# Patient Record
Sex: FEMALE | Race: WHITE | NOT HISPANIC OR LATINO | URBAN - METROPOLITAN AREA
[De-identification: names, ages, dates, MRNs, and addresses within clinical notes are randomized per-mention and may not be internally consistent; named-entity substitution may affect disease eponyms.]

---

## 2020-06-15 ENCOUNTER — TELEPHONE ENCOUNTER (OUTPATIENT)
Dept: URBAN - METROPOLITAN AREA CLINIC 80 | Facility: CLINIC | Age: 60
End: 2020-06-15

## 2020-06-15 RX ORDER — HYDROCORTISONE 100 MG/60ML
INSERT 60 MILLILITERS (100 MG) BY RECTAL ROUTE ONCE DAILY FOR 3 WEEKS ENEMA RECTAL 1
Qty: 1
Start: 2020-05-23

## 2020-07-24 ENCOUNTER — TELEPHONE ENCOUNTER (OUTPATIENT)
Dept: URBAN - METROPOLITAN AREA CLINIC 92 | Facility: CLINIC | Age: 60
End: 2020-07-24

## 2020-07-24 RX ORDER — MESALAMINE 1.2 G/1
TAKE 4 TABLETS BY MOUTH ONCE DAILY TABLET, DELAYED RELEASE ORAL ONCE A DAY
Qty: 360 | Refills: 3
Start: 2019-03-22 | End: 2020-03-16

## 2020-11-02 ENCOUNTER — WEB ENCOUNTER (OUTPATIENT)
Dept: URBAN - METROPOLITAN AREA CLINIC 80 | Facility: CLINIC | Age: 60
End: 2020-11-02

## 2020-11-02 ENCOUNTER — OFFICE VISIT (OUTPATIENT)
Dept: URBAN - METROPOLITAN AREA CLINIC 80 | Facility: CLINIC | Age: 60
End: 2020-11-02
Payer: COMMERCIAL

## 2020-11-02 DIAGNOSIS — K21.9 GERD: ICD-10-CM

## 2020-11-02 DIAGNOSIS — K51.30 ULCERATIVE PROCTOSIGMOIDITIS: ICD-10-CM

## 2020-11-02 DIAGNOSIS — R19.7 DIARRHEA: ICD-10-CM

## 2020-11-02 PROCEDURE — 3017F COLORECTAL CA SCREEN DOC REV: CPT | Performed by: INTERNAL MEDICINE

## 2020-11-02 PROCEDURE — G8482 FLU IMMUNIZE ORDER/ADMIN: HCPCS | Performed by: INTERNAL MEDICINE

## 2020-11-02 PROCEDURE — G8427 DOCREV CUR MEDS BY ELIG CLIN: HCPCS | Performed by: INTERNAL MEDICINE

## 2020-11-02 PROCEDURE — G8417 CALC BMI ABV UP PARAM F/U: HCPCS | Performed by: INTERNAL MEDICINE

## 2020-11-02 PROCEDURE — 99214 OFFICE O/P EST MOD 30 MIN: CPT | Performed by: INTERNAL MEDICINE

## 2020-11-02 PROCEDURE — G9903 PT SCRN TBCO ID AS NON USER: HCPCS | Performed by: INTERNAL MEDICINE

## 2020-11-02 RX ORDER — NORTRIPTYLINE HYDROCHLORIDE 10 MG/1
TAKE 2 CAPSULES (20 MG) BY ORAL ROUTE 4 TIMES PER DAY CAPSULE ORAL
Qty: 0 | Refills: 0 | Status: ACTIVE | COMMUNITY
Start: 1900-01-01

## 2020-11-02 RX ORDER — PROGESTERONE 100 MG/1
CAPSULE ORAL
Qty: 0 | Refills: 0 | Status: ACTIVE | COMMUNITY
Start: 1900-01-01

## 2020-11-02 RX ORDER — SODIUM, POTASSIUM,MAG SULFATES 17.5-3.13G
354ML SOLUTION, RECONSTITUTED, ORAL ORAL
Qty: 354 MILLILITER | Refills: 0 | OUTPATIENT
Start: 2020-11-02

## 2020-11-02 RX ORDER — SUCRALFATE 1 G/1
TAKE 1 TABLET BY ORAL ROUTE 4 TIMES A DAY AS NEEDED FOR 30 DAYS TABLET ORAL
Qty: 120 | Refills: 2 | Status: ACTIVE | COMMUNITY
Start: 2017-06-22

## 2020-11-02 RX ORDER — ATENOLOL 25 MG/1
TABLET ORAL
Qty: 0 | Refills: 0 | Status: ACTIVE | COMMUNITY
Start: 1900-01-01

## 2020-11-02 RX ORDER — HYDROCORTISONE 100 MG/60ML
INSERT 60 MILLILITERS (100 MG) BY RECTAL ROUTE ONCE DAILY FOR 3 WEEKS ENEMA RECTAL 1
Qty: 1 | Status: ACTIVE | COMMUNITY
Start: 2020-05-23

## 2020-11-02 RX ORDER — ONDANSETRON HYDROCHLORIDE 4 MG/1
TAKE 1 TABLET BY ORAL ROUTE 3 TIMES A DAY AS NEEDED FOR 30 DAYS TABLET, FILM COATED ORAL
Qty: 30 | Refills: 0 | Status: ACTIVE | COMMUNITY
Start: 2017-06-22

## 2020-11-02 RX ORDER — HYDROCORTISONE 100 MG/60ML
INSERT 60 MILLILITERS (100 MG) BY RECTAL ROUTE ONCE DAILY FOR 3 WEEKS ENEMA RECTAL 1
Qty: 1
Start: 2020-05-23

## 2020-11-02 RX ORDER — FAMOTIDINE 20 MG/1
TAKE 1 TABLET BY ORAL ROUTE 2 TIMES A DAY AS NEEDED FOR 90 DAYS TABLET ORAL 2
Qty: 180 | Refills: 3 | Status: ACTIVE | COMMUNITY
Start: 2020-01-24 | End: 2021-01-18

## 2020-11-02 RX ORDER — HYDROCORTISONE ACETATE 25 MG/1
UNWRAP AND _INSERT 1 SUPPOSITORY RECTALLY TWICE A DAY SUPPOSITORY RECTAL
Qty: 8 | Refills: 1 | Status: ACTIVE | COMMUNITY
Start: 2018-05-17

## 2020-11-02 NOTE — HPI-TODAY'S VISIT:
The patient is a 59 year old /White female, who presents on referral from AMBIKA Garza MD, for a gastroenterology evaluation for ulcerative colitis. last seen in 7/2019.  A copy of this document will be sent to the referring provider.    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  cortenemas help the most notes some nausea with this but no vomiting energy is mildly low some urgency stable weight normal appetite.  no other complaints.

## 2020-11-04 ENCOUNTER — TELEPHONE ENCOUNTER (OUTPATIENT)
Dept: URBAN - METROPOLITAN AREA CLINIC 92 | Facility: CLINIC | Age: 60
End: 2020-11-04

## 2020-11-04 RX ORDER — ONDANSETRON HYDROCHLORIDE 4 MG/1
TAKE 1 TABLET BY ORAL ROUTE 3 TIMES A DAY AS NEEDED FOR 30 DAYS TABLET, FILM COATED ORAL
Qty: 60 | Refills: 2

## 2020-12-11 LAB — C DIFFICILE TOXINS A+B, EIA: NEGATIVE

## 2020-12-16 ENCOUNTER — TELEPHONE ENCOUNTER (OUTPATIENT)
Dept: URBAN - METROPOLITAN AREA CLINIC 92 | Facility: CLINIC | Age: 60
End: 2020-12-16

## 2020-12-16 RX ORDER — HYDROCORTISONE 100 MG/60ML
INSERT 60 MILLILITERS (100 MG) BY RECTAL ROUTE ONCE DAILY FOR 2 WEEKS ENEMA RECTAL QHS
Qty: 60 | Refills: 1
Start: 2020-05-23 | End: 2021-12-15

## 2021-01-11 ENCOUNTER — WEB ENCOUNTER (OUTPATIENT)
Dept: URBAN - METROPOLITAN AREA CLINIC 80 | Facility: CLINIC | Age: 61
End: 2021-01-11

## 2021-01-14 ENCOUNTER — WEB ENCOUNTER (OUTPATIENT)
Dept: URBAN - METROPOLITAN AREA CLINIC 13 | Facility: CLINIC | Age: 61
End: 2021-01-14

## 2021-01-15 ENCOUNTER — OFFICE VISIT (OUTPATIENT)
Dept: URBAN - METROPOLITAN AREA SURGERY CENTER 31 | Facility: SURGERY CENTER | Age: 61
End: 2021-01-15
Payer: COMMERCIAL

## 2021-01-15 DIAGNOSIS — K51.30 CHRONIC ULCERATIVE PROCTOSIGMOIDITIS, WITHOUT COMPLICATIONS: ICD-10-CM

## 2021-01-15 DIAGNOSIS — K63.5 HYPERPLASTIC POLYP OF SIGMOID COLON: ICD-10-CM

## 2021-01-15 DIAGNOSIS — K51.50 LEFT SIDED COLITIS: ICD-10-CM

## 2021-01-15 PROCEDURE — G8907 PT DOC NO EVENTS ON DISCHARG: HCPCS | Performed by: INTERNAL MEDICINE

## 2021-01-15 PROCEDURE — 45380 COLONOSCOPY AND BIOPSY: CPT | Performed by: INTERNAL MEDICINE

## 2021-01-15 PROCEDURE — 45385 COLONOSCOPY W/LESION REMOVAL: CPT | Performed by: INTERNAL MEDICINE

## 2021-01-18 ENCOUNTER — WEB ENCOUNTER (OUTPATIENT)
Dept: URBAN - METROPOLITAN AREA CLINIC 80 | Facility: CLINIC | Age: 61
End: 2021-01-18

## 2021-01-18 RX ORDER — AZATHIOPRINE 50 MG/1
2 TABLETS TABLET ORAL
Qty: 180 | Refills: 3 | OUTPATIENT
Start: 2021-01-19 | End: 2022-01-14

## 2021-01-18 RX ORDER — MESALAMINE 1.2 G/1
TAKE 4 TABLETS BY MOUTH ONCE DAILY TABLET, DELAYED RELEASE ORAL ONCE A DAY
Qty: 360 | Refills: 3

## 2021-02-17 ENCOUNTER — TELEPHONE ENCOUNTER (OUTPATIENT)
Dept: URBAN - METROPOLITAN AREA CLINIC 96 | Facility: CLINIC | Age: 61
End: 2021-02-17

## 2021-02-18 ENCOUNTER — TELEPHONE ENCOUNTER (OUTPATIENT)
Dept: URBAN - METROPOLITAN AREA CLINIC 92 | Facility: CLINIC | Age: 61
End: 2021-02-18

## 2021-02-18 RX ORDER — PREDNISONE 20 MG/1
2 TABLETS TABLET ORAL ONCE A DAY
Qty: 28 | Refills: 1 | OUTPATIENT
Start: 2021-02-18 | End: 2021-03-18

## 2021-02-22 ENCOUNTER — ERX REFILL RESPONSE (OUTPATIENT)
Dept: URBAN - METROPOLITAN AREA CLINIC 80 | Facility: CLINIC | Age: 61
End: 2021-02-22

## 2021-02-22 RX ORDER — FAMOTIDINE 20 MG/1
TAKE 1 TABLET BY MOUTH TWICE DAILY TABLET, FILM COATED ORAL
Qty: 180 | Refills: 3

## 2021-03-01 ENCOUNTER — OFFICE VISIT (OUTPATIENT)
Dept: URBAN - METROPOLITAN AREA CLINIC 80 | Facility: CLINIC | Age: 61
End: 2021-03-01
Payer: COMMERCIAL

## 2021-03-01 DIAGNOSIS — R19.7 DIARRHEA: ICD-10-CM

## 2021-03-01 DIAGNOSIS — K21.9 GERD: ICD-10-CM

## 2021-03-01 DIAGNOSIS — K51.30 ULCERATIVE PROCTOSIGMOIDITIS: ICD-10-CM

## 2021-03-01 PROCEDURE — 99214 OFFICE O/P EST MOD 30 MIN: CPT | Performed by: INTERNAL MEDICINE

## 2021-03-01 RX ORDER — SUCRALFATE 1 G/1
TAKE 1 TABLET BY ORAL ROUTE 4 TIMES A DAY AS NEEDED FOR 30 DAYS TABLET ORAL
Qty: 120 | Refills: 2 | Status: ACTIVE | COMMUNITY
Start: 2017-06-22

## 2021-03-01 RX ORDER — FAMOTIDINE 20 MG/1
TAKE 1 TABLET BY MOUTH TWICE DAILY TABLET, FILM COATED ORAL
Qty: 180 | Refills: 3 | Status: ACTIVE | COMMUNITY

## 2021-03-01 RX ORDER — AZATHIOPRINE 50 MG/1
2 TABLETS TABLET ORAL
Qty: 180 | Refills: 3 | Status: ACTIVE | COMMUNITY
Start: 2021-01-19 | End: 2022-01-14

## 2021-03-01 RX ORDER — NORTRIPTYLINE HYDROCHLORIDE 10 MG/1
TAKE 2 CAPSULES (20 MG) BY ORAL ROUTE 4 TIMES PER DAY CAPSULE ORAL
Qty: 0 | Refills: 0 | Status: ACTIVE | COMMUNITY
Start: 1900-01-01

## 2021-03-01 RX ORDER — ONDANSETRON HYDROCHLORIDE 4 MG/1
TAKE 1 TABLET BY ORAL ROUTE 3 TIMES A DAY AS NEEDED FOR 30 DAYS TABLET, FILM COATED ORAL
Qty: 60 | Refills: 2 | Status: ACTIVE | COMMUNITY

## 2021-03-01 RX ORDER — HYDROCORTISONE 100 MG/60ML
_INSERT 60 MILLILITERS (100 MG) BY RECTAL ROUTE ONCE DAILY FOR 2 WEEKS ENEMA RECTAL QHS
Qty: 60 | Refills: 1 | Status: ACTIVE | COMMUNITY
Start: 2020-05-23 | End: 2021-12-15

## 2021-03-01 RX ORDER — SODIUM, POTASSIUM,MAG SULFATES 17.5-3.13G
354ML SOLUTION, RECONSTITUTED, ORAL ORAL
Qty: 354 MILLILITER | Refills: 0 | Status: ACTIVE | COMMUNITY
Start: 2020-11-02

## 2021-03-01 RX ORDER — MESALAMINE 1.2 G/1
TAKE 4 TABLETS BY MOUTH ONCE DAILY TABLET, DELAYED RELEASE ORAL ONCE A DAY
Qty: 360 | Refills: 3 | Status: ACTIVE | COMMUNITY

## 2021-03-01 RX ORDER — PREDNISONE 20 MG/1
2 TABLETS TABLET ORAL ONCE A DAY
Qty: 28 | Refills: 1 | Status: ACTIVE | COMMUNITY
Start: 2021-02-18 | End: 2021-03-18

## 2021-03-01 RX ORDER — HYDROCORTISONE ACETATE 25 MG/1
UNWRAP AND _INSERT 1 SUPPOSITORY RECTALLY TWICE A DAY SUPPOSITORY RECTAL
Qty: 8 | Refills: 1 | Status: ACTIVE | COMMUNITY
Start: 2018-05-17

## 2021-03-01 RX ORDER — PROGESTERONE 100 MG/1
CAPSULE ORAL
Qty: 0 | Refills: 0 | Status: ACTIVE | COMMUNITY
Start: 1900-01-01

## 2021-03-01 RX ORDER — ATENOLOL 25 MG/1
TABLET ORAL
Qty: 0 | Refills: 0 | Status: ACTIVE | COMMUNITY
Start: 1900-01-01

## 2021-03-01 NOTE — HPI-TODAY'S VISIT:
The patient is a 60 year old /White female, who presents on referral from AMBIKA Garza MD, for a gastroenterology evaluation for ulcerative colitis. last seen in 11/2020 .   she had active disease and had colon in 1/15/2021 with normal TI biospies. active colitis on colon biopsies.  here to discuss therapy.  A copy of this document will be sent to the referring provider.    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   here to discuss therapy i gave her imuran at the time of the colon due to noted colitis.  she did not start it ntoes improvement on prednisone now down to 20mg dosing notes 3-7 bm/day loose with no urgency no mucus or blood no abd pain energy is mostly normal discussed biologic agent but she wants to hold off and  try diet changes and avoiding carbonated water otherwise, stable weight normal appetite.  no other complaints.

## 2021-03-05 LAB
A/G RATIO: 2
ALBUMIN: 4.6
ALKALINE PHOSPHATASE: 81
ALT (SGPT): 25
AST (SGOT): 26
BASO (ABSOLUTE): 0
BASOS: 0
BILIRUBIN, TOTAL: 0.3
BUN/CREATININE RATIO: 18
BUN: 17
C-REACTIVE PROTEIN, QUANT: 1
CALCIUM: 9.7
CARBON DIOXIDE, TOTAL: 24
CHLORIDE: 102
CREATININE: 0.93
EGFR IF AFRICN AM: 78
EGFR IF NONAFRICN AM: 67
EOS (ABSOLUTE): 0
EOS: 0
GLOBULIN, TOTAL: 2.3
GLUCOSE: 107
HBSAG SCREEN: NEGATIVE
HEMATOCRIT: 40.1
HEMATOLOGY COMMENTS:: (no result)
HEMOGLOBIN: 13.3
HEP A AB, TOTAL: NEGATIVE
HEP B CORE AB, TOT: NEGATIVE
HEP B SURFACE AB, QUAL: NON REACTIVE
IMMATURE CELLS: (no result)
IMMATURE GRANS (ABS): 0.1
IMMATURE GRANULOCYTES: 1
LYMPHS (ABSOLUTE): 1.8
LYMPHS: 17
MCH: 30
MCHC: 33.2
MCV: 91
MONOCYTES(ABSOLUTE): 0.3
MONOCYTES: 3
NEUTROPHILS (ABSOLUTE): 8.2
NEUTROPHILS: 79
NRBC: (no result)
PLATELETS: 331
POTASSIUM: 5.2
PROTEIN, TOTAL: 6.9
QUANTIFERON CRITERIA: (no result)
QUANTIFERON INCUBATION: (no result)
QUANTIFERON MITOGEN VALUE: >10
QUANTIFERON NIL VALUE: 0.03
QUANTIFERON TB1 AG VALUE: 0.03
QUANTIFERON TB2 AG VALUE: 0.03
QUANTIFERON-TB GOLD PLUS: NEGATIVE
RBC: 4.43
RDW: 12
SODIUM: 139
WBC: 10.4

## 2021-03-08 ENCOUNTER — TELEPHONE ENCOUNTER (OUTPATIENT)
Dept: URBAN - METROPOLITAN AREA CLINIC 19 | Facility: CLINIC | Age: 61
End: 2021-03-08

## 2021-03-29 ENCOUNTER — OFFICE VISIT (OUTPATIENT)
Dept: URBAN - METROPOLITAN AREA CLINIC 80 | Facility: CLINIC | Age: 61
End: 2021-03-29

## 2021-03-30 ENCOUNTER — TELEPHONE ENCOUNTER (OUTPATIENT)
Dept: URBAN - METROPOLITAN AREA CLINIC 92 | Facility: CLINIC | Age: 61
End: 2021-03-30

## 2021-03-30 RX ORDER — CIMETIDINE 200 MG/1
1 TABLET AS NEEDED TABLET, FILM COATED ORAL TWICE A DAY
Qty: 180 TABLET | Refills: 3 | OUTPATIENT
Start: 2021-03-30

## 2021-03-30 RX ORDER — FAMOTIDINE 20 MG/1
TAKE 1 TABLET BY MOUTH TWICE DAILY TABLET, FILM COATED ORAL
OUTPATIENT

## 2021-04-01 ENCOUNTER — WEB ENCOUNTER (OUTPATIENT)
Dept: URBAN - METROPOLITAN AREA CLINIC 80 | Facility: CLINIC | Age: 61
End: 2021-04-01

## 2021-04-01 RX ORDER — ONDANSETRON HYDROCHLORIDE 4 MG/1
TAKE 1 TABLET BY ORAL ROUTE 3 TIMES A DAY AS NEEDED FOR 30 DAYS TABLET, FILM COATED ORAL
Qty: 60 | Refills: 2 | Status: ACTIVE | COMMUNITY

## 2021-04-01 RX ORDER — MESALAMINE 1.2 G/1
TAKE 4 TABLETS BY MOUTH ONCE DAILY TABLET, DELAYED RELEASE ORAL ONCE A DAY
Qty: 360 | Refills: 3 | Status: ACTIVE | COMMUNITY

## 2021-04-01 RX ORDER — PREDNISONE 20 MG/1
2 TABLETS TABLET ORAL ONCE A DAY
Qty: 28 | Refills: 1 | OUTPATIENT
Start: 2021-04-02 | End: 2021-04-30

## 2021-04-01 RX ORDER — HYDROCORTISONE ACETATE 25 MG/1
UNWRAP AND _INSERT 1 SUPPOSITORY RECTALLY TWICE A DAY SUPPOSITORY RECTAL
Qty: 8 | Refills: 1 | Status: ACTIVE | COMMUNITY
Start: 2018-05-17

## 2021-04-01 RX ORDER — HYDROCORTISONE 100 MG/60ML
_INSERT 60 MILLILITERS (100 MG) BY RECTAL ROUTE ONCE DAILY FOR 2 WEEKS ENEMA RECTAL QHS
Qty: 60 | Refills: 1 | Status: ACTIVE | COMMUNITY
Start: 2020-05-23 | End: 2021-12-15

## 2021-04-01 RX ORDER — PROGESTERONE 100 MG/1
CAPSULE ORAL
Qty: 0 | Refills: 0 | Status: ACTIVE | COMMUNITY
Start: 1900-01-01

## 2021-04-01 RX ORDER — SUCRALFATE 1 G/1
TAKE 1 TABLET BY ORAL ROUTE 4 TIMES A DAY AS NEEDED FOR 30 DAYS TABLET ORAL
Qty: 120 | Refills: 2 | Status: ACTIVE | COMMUNITY
Start: 2017-06-22

## 2021-04-01 RX ORDER — ATENOLOL 25 MG/1
TABLET ORAL
Qty: 0 | Refills: 0 | Status: ACTIVE | COMMUNITY
Start: 1900-01-01

## 2021-04-01 RX ORDER — CIMETIDINE 200 MG/1
1 TABLET AS NEEDED TABLET, FILM COATED ORAL TWICE A DAY
Qty: 180 TABLET | Refills: 3 | Status: ACTIVE | COMMUNITY
Start: 2021-03-30

## 2021-04-01 RX ORDER — AZATHIOPRINE 50 MG/1
2 TABLETS TABLET ORAL
Qty: 180 | Refills: 3 | Status: ACTIVE | COMMUNITY
Start: 2021-01-19 | End: 2022-01-14

## 2021-04-01 RX ORDER — NORTRIPTYLINE HYDROCHLORIDE 10 MG/1
TAKE 2 CAPSULES (20 MG) BY ORAL ROUTE 4 TIMES PER DAY CAPSULE ORAL
Qty: 0 | Refills: 0 | Status: ACTIVE | COMMUNITY
Start: 1900-01-01

## 2021-04-01 RX ORDER — SODIUM, POTASSIUM,MAG SULFATES 17.5-3.13G
354ML SOLUTION, RECONSTITUTED, ORAL ORAL
Qty: 354 MILLILITER | Refills: 0 | Status: ACTIVE | COMMUNITY
Start: 2020-11-02

## 2021-04-02 ENCOUNTER — WEB ENCOUNTER (OUTPATIENT)
Dept: URBAN - METROPOLITAN AREA CLINIC 80 | Facility: CLINIC | Age: 61
End: 2021-04-02

## 2021-05-03 ENCOUNTER — OFFICE VISIT (OUTPATIENT)
Dept: URBAN - METROPOLITAN AREA CLINIC 80 | Facility: CLINIC | Age: 61
End: 2021-05-03

## 2021-05-17 ENCOUNTER — OFFICE VISIT (OUTPATIENT)
Dept: URBAN - METROPOLITAN AREA CLINIC 80 | Facility: CLINIC | Age: 61
End: 2021-05-17
Payer: COMMERCIAL

## 2021-05-17 DIAGNOSIS — R19.7 DIARRHEA: ICD-10-CM

## 2021-05-17 DIAGNOSIS — K21.9 GERD: ICD-10-CM

## 2021-05-17 DIAGNOSIS — K51.30 ULCERATIVE PROCTOSIGMOIDITIS: ICD-10-CM

## 2021-05-17 PROCEDURE — 99214 OFFICE O/P EST MOD 30 MIN: CPT | Performed by: INTERNAL MEDICINE

## 2021-05-17 RX ORDER — PROGESTERONE 100 MG/1
CAPSULE ORAL
Qty: 0 | Refills: 0 | Status: ACTIVE | COMMUNITY
Start: 1900-01-01

## 2021-05-17 RX ORDER — SUCRALFATE 1 G/1
TAKE 1 TABLET BY ORAL ROUTE 4 TIMES A DAY AS NEEDED FOR 30 DAYS TABLET ORAL
Qty: 120 | Refills: 2 | Status: ACTIVE | COMMUNITY
Start: 2017-06-22

## 2021-05-17 RX ORDER — MESALAMINE 1.2 G/1
TAKE 4 TABLETS BY MOUTH ONCE DAILY TABLET, DELAYED RELEASE ORAL ONCE A DAY
Qty: 360 | Refills: 3 | Status: ACTIVE | COMMUNITY

## 2021-05-17 RX ORDER — HYDROCORTISONE ACETATE 25 MG/1
UNWRAP AND _INSERT 1 SUPPOSITORY RECTALLY TWICE A DAY SUPPOSITORY RECTAL
Qty: 8 | Refills: 1 | Status: ACTIVE | COMMUNITY
Start: 2018-05-17

## 2021-05-17 RX ORDER — AZATHIOPRINE 50 MG/1
2 TABLETS TABLET ORAL
OUTPATIENT
Start: 2021-01-19 | End: 2022-01-14

## 2021-05-17 RX ORDER — SODIUM, POTASSIUM,MAG SULFATES 17.5-3.13G
354ML SOLUTION, RECONSTITUTED, ORAL ORAL
Qty: 354 MILLILITER | Refills: 0 | Status: ON HOLD | COMMUNITY
Start: 2020-11-02

## 2021-05-17 RX ORDER — ONDANSETRON HYDROCHLORIDE 4 MG/1
TAKE 1 TABLET BY ORAL ROUTE 3 TIMES A DAY AS NEEDED FOR 30 DAYS TABLET, FILM COATED ORAL
Qty: 60 | Refills: 2 | Status: ACTIVE | COMMUNITY

## 2021-05-17 RX ORDER — NORTRIPTYLINE HYDROCHLORIDE 10 MG/1
TAKE 2 CAPSULES (20 MG) BY ORAL ROUTE 4 TIMES PER DAY CAPSULE ORAL
Qty: 0 | Refills: 0 | Status: ACTIVE | COMMUNITY
Start: 1900-01-01

## 2021-05-17 RX ORDER — PREDNISONE 20 MG/1
1 TABLETS TABLET ORAL ONCE A DAY
Qty: 30 | Refills: 1 | OUTPATIENT
Start: 2021-05-17 | End: 2021-07-16

## 2021-05-17 RX ORDER — ATENOLOL 25 MG/1
TABLET ORAL
Qty: 0 | Refills: 0 | Status: ACTIVE | COMMUNITY
Start: 1900-01-01

## 2021-05-17 RX ORDER — CIMETIDINE 200 MG/1
1 TABLET AS NEEDED TABLET, FILM COATED ORAL TWICE A DAY
Qty: 180 TABLET | Refills: 3 | Status: ACTIVE | COMMUNITY
Start: 2021-03-30

## 2021-05-17 RX ORDER — HYDROCORTISONE 100 MG/60ML
_INSERT 60 MILLILITERS (100 MG) BY RECTAL ROUTE ONCE DAILY FOR 2 WEEKS ENEMA RECTAL QHS
Qty: 60 | Refills: 1 | Status: ACTIVE | COMMUNITY
Start: 2020-05-23 | End: 2021-12-15

## 2021-05-17 RX ORDER — AZATHIOPRINE 75 1/1
2 PILLS DAILY TABLET ORAL DAILY
Qty: 180 | Refills: 3 | OUTPATIENT
Start: 2021-05-17 | End: 2022-05-12

## 2021-05-17 RX ORDER — AZATHIOPRINE 50 MG/1
2 TABLETS TABLET ORAL
Qty: 180 | Refills: 3 | Status: ACTIVE | COMMUNITY
Start: 2021-01-19 | End: 2022-01-14

## 2021-05-17 NOTE — HPI-TODAY'S VISIT:
The patient is a 60 year old /White female, who presents on referral from AMBIKA Garza MD, for a gastroenterology evaluation for ulcerative colitis. last seen in 3/1/2021.   she had active disease and had colon in 1/15/2021 with normal TI biospies. active colitis on colon biopsies.   here to discuss therapy given ongoing disease.   A copy of this document will be sent to the referring provider.   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   colon in 1/2021 with normal TI and signs of active colitis discussed needing to advance tx to biologic but she had reservations she feels taht there is increased stress at work that may improve soono improving on prednisone previously given imuran at the time of the colonoscopy but she did not start it improved on prednisone 20mg now off prednisone with recurrent symptoms notes 6-7 bm/day loose with no urgency no mucus or blood no abd pain energy is mostly normal discussed biologic agent but she wants to avoid.  discussed immune modulator and she is open to it.   otherwise, stable weight normal appetite.  no other complaints.

## 2021-05-18 ENCOUNTER — TELEPHONE ENCOUNTER (OUTPATIENT)
Dept: URBAN - METROPOLITAN AREA CLINIC 23 | Facility: CLINIC | Age: 61
End: 2021-05-18

## 2021-05-18 RX ORDER — AZATHIOPRINE 75 1/1
2 PILLS DAILY TABLET ORAL DAILY
OUTPATIENT
Start: 2021-05-17 | End: 2022-05-12

## 2021-05-18 RX ORDER — AZATHIOPRINE 50 MG/1
3 TABLETS DAILY TABLET ORAL DAILY
Qty: 270 TABLET | Refills: 3 | OUTPATIENT
Start: 2021-05-24 | End: 2022-05-19

## 2021-05-20 LAB
A/G RATIO: 1.9
ALBUMIN: 4.3
ALKALINE PHOSPHATASE: 89
ALT (SGPT): 18
AST (SGOT): 19
BASO (ABSOLUTE): 0
BASOS: 1
BILIRUBIN, TOTAL: 0.3
BUN/CREATININE RATIO: 23
BUN: 18
C-REACTIVE PROTEIN, QUANT: 12
CALCIUM: 9.1
CARBON DIOXIDE, TOTAL: 22
CHLORIDE: 105
CREATININE: 0.8
EGFR IF AFRICN AM: 93
EGFR IF NONAFRICN AM: 81
EOS (ABSOLUTE): 0.3
EOS: 4
GLOBULIN, TOTAL: 2.3
GLUCOSE: 84
HEMATOCRIT: 39.2
HEMATOLOGY COMMENTS:: (no result)
HEMOGLOBIN: 12.9
IMMATURE CELLS: (no result)
IMMATURE GRANS (ABS): 0
IMMATURE GRANULOCYTES: 0
INTERPRETATION:: (no result)
LYMPHS (ABSOLUTE): 2.4
LYMPHS: 32
Lab: (no result)
MCH: 30.1
MCHC: 32.9
MCV: 91
MONOCYTES(ABSOLUTE): 0.8
MONOCYTES: 11
NEUTROPHILS (ABSOLUTE): 3.9
NEUTROPHILS: 52
NRBC: (no result)
PLATELETS: 285
POTASSIUM: 4.3
PROTEIN, TOTAL: 6.6
RBC: 4.29
RDW: 13.1
SODIUM: 139
TPMT ACTIVITY: 11.6
WBC: 7.4

## 2021-05-24 ENCOUNTER — WEB ENCOUNTER (OUTPATIENT)
Dept: URBAN - METROPOLITAN AREA CLINIC 80 | Facility: CLINIC | Age: 61
End: 2021-05-24

## 2021-06-15 ENCOUNTER — WEB ENCOUNTER (OUTPATIENT)
Dept: URBAN - METROPOLITAN AREA CLINIC 80 | Facility: CLINIC | Age: 61
End: 2021-06-15

## 2021-06-16 ENCOUNTER — WEB ENCOUNTER (OUTPATIENT)
Dept: URBAN - METROPOLITAN AREA CLINIC 80 | Facility: CLINIC | Age: 61
End: 2021-06-16

## 2021-06-18 LAB
C-REACTIVE PROTEIN, QUANT: 4
HEMATOCRIT: 42.3
HEMOGLOBIN: 13
MCH: 28.4
MCHC: 30.7
MCV: 93
NRBC: (no result)
PLATELETS: 324
RBC: 4.57
RDW: 13
WBC: 8.7

## 2021-07-12 ENCOUNTER — WEB ENCOUNTER (OUTPATIENT)
Dept: URBAN - METROPOLITAN AREA CLINIC 80 | Facility: CLINIC | Age: 61
End: 2021-07-12

## 2021-07-14 ENCOUNTER — WEB ENCOUNTER (OUTPATIENT)
Dept: URBAN - METROPOLITAN AREA CLINIC 80 | Facility: CLINIC | Age: 61
End: 2021-07-14

## 2021-07-19 ENCOUNTER — OFFICE VISIT (OUTPATIENT)
Dept: URBAN - METROPOLITAN AREA CLINIC 80 | Facility: CLINIC | Age: 61
End: 2021-07-19

## 2021-07-26 ENCOUNTER — OFFICE VISIT (OUTPATIENT)
Dept: URBAN - METROPOLITAN AREA CLINIC 80 | Facility: CLINIC | Age: 61
End: 2021-07-26
Payer: COMMERCIAL

## 2021-07-26 VITALS
TEMPERATURE: 97.5 F | SYSTOLIC BLOOD PRESSURE: 128 MMHG | HEART RATE: 69 BPM | WEIGHT: 189 LBS | DIASTOLIC BLOOD PRESSURE: 76 MMHG | BODY MASS INDEX: 32.27 KG/M2 | HEIGHT: 64 IN

## 2021-07-26 DIAGNOSIS — K51.30 ULCERATIVE PROCTOSIGMOIDITIS: ICD-10-CM

## 2021-07-26 DIAGNOSIS — R19.7 DIARRHEA: ICD-10-CM

## 2021-07-26 DIAGNOSIS — K21.9 GERD: ICD-10-CM

## 2021-07-26 PROCEDURE — 99214 OFFICE O/P EST MOD 30 MIN: CPT | Performed by: INTERNAL MEDICINE

## 2021-07-26 RX ORDER — SODIUM, POTASSIUM,MAG SULFATES 17.5-3.13G
354ML SOLUTION, RECONSTITUTED, ORAL ORAL
Qty: 354 MILLILITER | Refills: 0 | Status: ON HOLD | COMMUNITY
Start: 2020-11-02

## 2021-07-26 RX ORDER — ONDANSETRON HYDROCHLORIDE 4 MG/1
TAKE 1 TABLET BY ORAL ROUTE 3 TIMES A DAY AS NEEDED FOR 30 DAYS TABLET, FILM COATED ORAL
Qty: 60 | Refills: 2 | Status: ACTIVE | COMMUNITY

## 2021-07-26 RX ORDER — AZATHIOPRINE 50 MG/1
3 TABLETS DAILY TABLET ORAL DAILY
Qty: 270 TABLET | Refills: 3 | Status: ACTIVE | COMMUNITY
Start: 2021-05-24 | End: 2022-05-19

## 2021-07-26 RX ORDER — CIMETIDINE 200 MG/1
1 TABLET AS NEEDED TABLET, FILM COATED ORAL TWICE A DAY
Qty: 180 TABLET | Refills: 3 | Status: ACTIVE | COMMUNITY
Start: 2021-03-30

## 2021-07-26 RX ORDER — MESALAMINE 1.2 G/1
TAKE 4 TABLETS BY MOUTH ONCE DAILY TABLET, DELAYED RELEASE ORAL ONCE A DAY
Qty: 360 | Refills: 3 | Status: ACTIVE | COMMUNITY

## 2021-07-26 RX ORDER — PROGESTERONE 100 MG/1
CAPSULE ORAL
Qty: 0 | Refills: 0 | Status: ACTIVE | COMMUNITY
Start: 1900-01-01

## 2021-07-26 RX ORDER — NORTRIPTYLINE HYDROCHLORIDE 10 MG/1
TAKE 2 CAPSULES (20 MG) BY ORAL ROUTE 4 TIMES PER DAY CAPSULE ORAL
Qty: 0 | Refills: 0 | Status: ACTIVE | COMMUNITY
Start: 1900-01-01

## 2021-07-26 RX ORDER — HYDROCORTISONE ACETATE 25 MG/1
UNWRAP AND _INSERT 1 SUPPOSITORY RECTALLY TWICE A DAY SUPPOSITORY RECTAL
Qty: 8 | Refills: 1 | Status: ACTIVE | COMMUNITY
Start: 2018-05-17

## 2021-07-26 RX ORDER — ATENOLOL 25 MG/1
TABLET ORAL
Qty: 0 | Refills: 0 | Status: ACTIVE | COMMUNITY
Start: 1900-01-01

## 2021-07-26 RX ORDER — HYDROCORTISONE 100 MG/60ML
_INSERT 60 MILLILITERS (100 MG) BY RECTAL ROUTE ONCE DAILY FOR 2 WEEKS ENEMA RECTAL QHS
Qty: 60 | Refills: 1 | Status: ACTIVE | COMMUNITY
Start: 2020-05-23 | End: 2021-12-15

## 2021-07-26 RX ORDER — SUCRALFATE 1 G/1
TAKE 1 TABLET BY ORAL ROUTE 4 TIMES A DAY AS NEEDED FOR 30 DAYS TABLET ORAL
Qty: 120 | Refills: 2 | Status: ACTIVE | COMMUNITY
Start: 2017-06-22

## 2021-07-26 RX ORDER — AZATHIOPRINE 50 MG/1
3 TABLETS DAILY TABLET ORAL DAILY
OUTPATIENT
Start: 2021-05-24 | End: 2022-05-19

## 2021-07-26 NOTE — HPI-TODAY'S VISIT:
The patient is a 60 year old /White female, who presents on referral from AMBIKA Garza MD, for a gastroenterology evaluation for ulcerative colitis.  previously stable on lialda has had active disease requiring prednisone Colon in 1/15/2021 with normal TI biospies. active colitis on colon biopsies.   started on imuran 150mg qday had side effects of nausea, arthritis, and fatigue here for follow up A copy of this document will be sent to the referring provider.   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   colon in 1/2021 with normal TI and signs of active colitis discussed needing to advance tx to biologic but she had reservations improving on prednisone but unable to wean off started imuran with improvement in her bm and was able to stop prednisone developed diffuse arthritis, fatigue and nausea  had pt stop the imuran here for discuss next step resolution of her nausea and significnat improvement in her arthritis now.   notes 2-3 bm/day mostly in the morning notes some urgency no mucus or blood no abd pain energy is mostly normal discussed next option for tx otherwise, stable weight normal appetite.  no other complaints.

## 2021-08-02 ENCOUNTER — TELEPHONE ENCOUNTER (OUTPATIENT)
Dept: URBAN - METROPOLITAN AREA CLINIC 80 | Facility: CLINIC | Age: 61
End: 2021-08-02

## 2021-08-02 RX ORDER — MESALAMINE 1.2 G/1
TAKE 4 TABLETS BY MOUTH ONCE DAILY TABLET, DELAYED RELEASE ORAL ONCE A DAY
Qty: 360 | Refills: 3
End: 2022-07-28

## 2021-08-03 LAB
A/G RATIO: 1.9
ALBUMIN: 4.6
ALKALINE PHOSPHATASE: 110
ALT (SGPT): 17
AST (SGOT): 20
BASO (ABSOLUTE): 0.1
BASOS: 1
BILIRUBIN, TOTAL: 0.4
BUN/CREATININE RATIO: 18
BUN: 14
C-REACTIVE PROTEIN, QUANT: 17
CALCIUM: 9.8
CARBON DIOXIDE, TOTAL: 21
CHLORIDE: 104
CREATININE: 0.78
EGFR IF AFRICN AM: 96
EGFR IF NONAFRICN AM: 83
EOS (ABSOLUTE): 0.5
EOS: 7
GLOBULIN, TOTAL: 2.4
GLUCOSE: 79
HEMATOCRIT: 38.9
HEMATOLOGY COMMENTS:: (no result)
HEMOGLOBIN: 12.7
HEP A AB, TOTAL: NEGATIVE
HEP B CORE AB, TOT: NEGATIVE
HEP B SURFACE AB, QUAL: NON REACTIVE
HEP C VIRUS AB: 0.1
HEPATITIS B SURF AB QUANT: <3.1
IMMATURE CELLS: (no result)
IMMATURE GRANS (ABS): 0
IMMATURE GRANULOCYTES: 0
LYMPHS (ABSOLUTE): 1.5
LYMPHS: 21
MCH: 31.5
MCHC: 32.6
MCV: 97
MONOCYTES(ABSOLUTE): 0.6
MONOCYTES: 9
NEUTROPHILS (ABSOLUTE): 4.2
NEUTROPHILS: 62
NRBC: (no result)
PLATELETS: 377
POTASSIUM: 4.4
PROTEIN, TOTAL: 7
QUANTIFERON CRITERIA: (no result)
QUANTIFERON INCUBATION: (no result)
QUANTIFERON MITOGEN VALUE: 0.53
QUANTIFERON NIL VALUE: 0.01
QUANTIFERON TB1 AG VALUE: 0.01
QUANTIFERON TB2 AG VALUE: 0.01
QUANTIFERON-TB GOLD PLUS: NEGATIVE
RBC: 4.03
RDW: 15.3
SODIUM: 140
WBC: 6.8

## 2021-08-11 ENCOUNTER — TELEPHONE ENCOUNTER (OUTPATIENT)
Dept: URBAN - METROPOLITAN AREA CLINIC 80 | Facility: CLINIC | Age: 61
End: 2021-08-11

## 2021-09-16 ENCOUNTER — TELEPHONE ENCOUNTER (OUTPATIENT)
Dept: URBAN - METROPOLITAN AREA CLINIC 80 | Facility: CLINIC | Age: 61
End: 2021-09-16

## 2021-09-24 ENCOUNTER — TELEPHONE ENCOUNTER (OUTPATIENT)
Dept: URBAN - METROPOLITAN AREA CLINIC 80 | Facility: CLINIC | Age: 61
End: 2021-09-24

## 2021-09-24 RX ORDER — HYDROCORTISONE 100 MG/60ML
_INSERT 60 MILLILITERS (100 MG) BY RECTAL ROUTE ONCE DAILY FOR 2 WEEKS ENEMA RECTAL QHS
Qty: 60 | Refills: 1 | Status: ACTIVE | COMMUNITY
Start: 2020-05-23 | End: 2021-12-15

## 2021-09-24 RX ORDER — ONDANSETRON HYDROCHLORIDE 4 MG/1
TAKE 1 TABLET BY ORAL ROUTE 3 TIMES A DAY AS NEEDED FOR 30 DAYS TABLET, FILM COATED ORAL
Qty: 60 | Refills: 2 | Status: ACTIVE | COMMUNITY

## 2021-09-24 RX ORDER — BUDESONIDE 9 MG/1
1 TABLET IN THE MORNING TABLET, EXTENDED RELEASE ORAL ONCE A DAY
Qty: 60 TABLET | Refills: 0 | OUTPATIENT
Start: 2021-09-24 | End: 2021-11-22

## 2021-09-24 RX ORDER — MESALAMINE 1.2 G/1
TAKE 4 TABLETS BY MOUTH ONCE DAILY TABLET, DELAYED RELEASE ORAL ONCE A DAY
Qty: 360 | Refills: 3 | Status: ACTIVE | COMMUNITY
End: 2022-07-28

## 2021-09-24 RX ORDER — HYDROCORTISONE ACETATE 25 MG/1
UNWRAP AND _INSERT 1 SUPPOSITORY RECTALLY TWICE A DAY SUPPOSITORY RECTAL
Qty: 8 | Refills: 1 | Status: ACTIVE | COMMUNITY
Start: 2018-05-17

## 2021-09-24 RX ORDER — PROGESTERONE 100 MG/1
CAPSULE ORAL
Qty: 0 | Refills: 0 | Status: ACTIVE | COMMUNITY
Start: 1900-01-01

## 2021-09-24 RX ORDER — CIMETIDINE 200 MG/1
1 TABLET AS NEEDED TABLET, FILM COATED ORAL TWICE A DAY
Qty: 180 TABLET | Refills: 3 | Status: ACTIVE | COMMUNITY
Start: 2021-03-30

## 2021-09-24 RX ORDER — NORTRIPTYLINE HYDROCHLORIDE 10 MG/1
TAKE 2 CAPSULES (20 MG) BY ORAL ROUTE 4 TIMES PER DAY CAPSULE ORAL
Qty: 0 | Refills: 0 | Status: ACTIVE | COMMUNITY
Start: 1900-01-01

## 2021-09-24 RX ORDER — SUCRALFATE 1 G/1
TAKE 1 TABLET BY ORAL ROUTE 4 TIMES A DAY AS NEEDED FOR 30 DAYS TABLET ORAL
Qty: 120 | Refills: 2 | Status: ACTIVE | COMMUNITY
Start: 2017-06-22

## 2021-09-24 RX ORDER — SODIUM, POTASSIUM,MAG SULFATES 17.5-3.13G
354ML SOLUTION, RECONSTITUTED, ORAL ORAL
Qty: 354 MILLILITER | Refills: 0 | Status: ON HOLD | COMMUNITY
Start: 2020-11-02

## 2021-09-24 RX ORDER — ATENOLOL 25 MG/1
TABLET ORAL
Qty: 0 | Refills: 0 | Status: ACTIVE | COMMUNITY
Start: 1900-01-01

## 2021-09-28 ENCOUNTER — OFFICE VISIT (OUTPATIENT)
Dept: URBAN - METROPOLITAN AREA CLINIC 80 | Facility: CLINIC | Age: 61
End: 2021-09-28
Payer: COMMERCIAL

## 2021-09-28 ENCOUNTER — WEB ENCOUNTER (OUTPATIENT)
Dept: URBAN - METROPOLITAN AREA CLINIC 80 | Facility: CLINIC | Age: 61
End: 2021-09-28

## 2021-09-28 VITALS
DIASTOLIC BLOOD PRESSURE: 77 MMHG | SYSTOLIC BLOOD PRESSURE: 131 MMHG | HEART RATE: 70 BPM | HEIGHT: 64 IN | TEMPERATURE: 97.2 F | BODY MASS INDEX: 32.61 KG/M2 | WEIGHT: 191 LBS

## 2021-09-28 DIAGNOSIS — K51.30 ULCERATIVE PROCTOSIGMOIDITIS: ICD-10-CM

## 2021-09-28 DIAGNOSIS — K21.9 GERD: ICD-10-CM

## 2021-09-28 DIAGNOSIS — R19.7 DIARRHEA: ICD-10-CM

## 2021-09-28 PROCEDURE — 99214 OFFICE O/P EST MOD 30 MIN: CPT | Performed by: INTERNAL MEDICINE

## 2021-09-28 RX ORDER — CIMETIDINE 200 MG/1
1 TABLET AS NEEDED TABLET, FILM COATED ORAL TWICE A DAY
Qty: 180 TABLET | Refills: 3 | Status: ACTIVE | COMMUNITY
Start: 2021-03-30

## 2021-09-28 RX ORDER — HYDROCORTISONE ACETATE 25 MG/1
UNWRAP AND _INSERT 1 SUPPOSITORY RECTALLY TWICE A DAY SUPPOSITORY RECTAL
Qty: 8 | Refills: 1 | Status: ACTIVE | COMMUNITY
Start: 2018-05-17

## 2021-09-28 RX ORDER — PROGESTERONE 100 MG/1
CAPSULE ORAL
Qty: 0 | Refills: 0 | Status: ACTIVE | COMMUNITY
Start: 1900-01-01

## 2021-09-28 RX ORDER — ONDANSETRON HYDROCHLORIDE 4 MG/1
TAKE 1 TABLET BY ORAL ROUTE 3 TIMES A DAY AS NEEDED FOR 30 DAYS TABLET, FILM COATED ORAL
Qty: 60 | Refills: 2 | Status: ACTIVE | COMMUNITY

## 2021-09-28 RX ORDER — HYDROCORTISONE 100 MG/60ML
_INSERT 60 MILLILITERS (100 MG) BY RECTAL ROUTE ONCE DAILY FOR 2 WEEKS ENEMA RECTAL QHS
Qty: 60 | Refills: 1 | Status: ACTIVE | COMMUNITY
Start: 2020-05-23 | End: 2021-12-15

## 2021-09-28 RX ORDER — NORTRIPTYLINE HYDROCHLORIDE 10 MG/1
TAKE 2 CAPSULES (20 MG) BY ORAL ROUTE 4 TIMES PER DAY CAPSULE ORAL
Qty: 0 | Refills: 0 | Status: ACTIVE | COMMUNITY
Start: 1900-01-01

## 2021-09-28 RX ORDER — SODIUM, POTASSIUM,MAG SULFATES 17.5-3.13G
354ML SOLUTION, RECONSTITUTED, ORAL ORAL
Qty: 354 MILLILITER | Refills: 0 | COMMUNITY
Start: 2020-11-02

## 2021-09-28 RX ORDER — SUCRALFATE 1 G/1
TAKE 1 TABLET BY ORAL ROUTE 4 TIMES A DAY AS NEEDED FOR 30 DAYS TABLET ORAL
Qty: 120 | Refills: 2 | Status: ACTIVE | COMMUNITY
Start: 2017-06-22

## 2021-09-28 RX ORDER — MESALAMINE 1.2 G/1
TAKE 4 TABLETS BY MOUTH ONCE DAILY TABLET, DELAYED RELEASE ORAL ONCE A DAY
Qty: 360 | Refills: 3 | Status: ACTIVE | COMMUNITY
End: 2022-07-28

## 2021-09-28 RX ORDER — ATENOLOL 25 MG/1
TABLET ORAL
Qty: 0 | Refills: 0 | Status: ACTIVE | COMMUNITY
Start: 1900-01-01

## 2021-09-28 NOTE — HPI-TODAY'S VISIT:
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   previously stable on lialda has had active disease requiring prednisone Colon in 1/15/2021 with normal TI biospies. active colitis on colon biopsies.   started on imuran 150mg qday had side effects of nausea, arthritis, and fatigue here for follow up recent flar this month and given budesonide A copy of this document will be sent to the referring provider.   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   colon in 1/2021 with normal TI and signs of active colitis discussed needing to advance tx to biologic but she had reservations improving on prednisone but unable to wean off started imuran with improvement in her bm and was able to stop prednisone developed diffuse arthritis, fatigue and nausea  had pt stop the imuran much better now after starting entocort notes 5-7 bm/day  no blood no abd pain no mucus energy improved and she believes it is back to normal stable weight normal appetite.  no other complaints.

## 2021-11-15 ENCOUNTER — OFFICE VISIT (OUTPATIENT)
Dept: URBAN - METROPOLITAN AREA CLINIC 80 | Facility: CLINIC | Age: 61
End: 2021-11-15

## 2021-11-15 RX ORDER — CIMETIDINE 200 MG/1
1 TABLET AS NEEDED TABLET, FILM COATED ORAL TWICE A DAY
Qty: 180 TABLET | Refills: 3 | Status: ACTIVE | COMMUNITY
Start: 2021-03-30

## 2021-11-15 RX ORDER — PROGESTERONE 100 MG/1
CAPSULE ORAL
Qty: 0 | Refills: 0 | Status: ACTIVE | COMMUNITY
Start: 1900-01-01

## 2021-11-15 RX ORDER — HYDROCORTISONE ACETATE 25 MG/1
UNWRAP AND _INSERT 1 SUPPOSITORY RECTALLY TWICE A DAY SUPPOSITORY RECTAL
Qty: 8 | Refills: 1 | Status: ACTIVE | COMMUNITY
Start: 2018-05-17

## 2021-11-15 RX ORDER — HYDROCORTISONE 100 MG/60ML
_INSERT 60 MILLILITERS (100 MG) BY RECTAL ROUTE ONCE DAILY FOR 2 WEEKS ENEMA RECTAL QHS
Qty: 60 | Refills: 1 | Status: ACTIVE | COMMUNITY
Start: 2020-05-23 | End: 2021-12-15

## 2021-11-15 RX ORDER — SUCRALFATE 1 G/1
TAKE 1 TABLET BY ORAL ROUTE 4 TIMES A DAY AS NEEDED FOR 30 DAYS TABLET ORAL
Qty: 120 | Refills: 2 | Status: ACTIVE | COMMUNITY
Start: 2017-06-22

## 2021-11-15 RX ORDER — NORTRIPTYLINE HYDROCHLORIDE 10 MG/1
TAKE 2 CAPSULES (20 MG) BY ORAL ROUTE 4 TIMES PER DAY CAPSULE ORAL
Qty: 0 | Refills: 0 | Status: ACTIVE | COMMUNITY
Start: 1900-01-01

## 2021-11-15 RX ORDER — SODIUM, POTASSIUM,MAG SULFATES 17.5-3.13G
354ML SOLUTION, RECONSTITUTED, ORAL ORAL
Qty: 354 MILLILITER | Refills: 0 | Status: DISCONTINUED | COMMUNITY
Start: 2020-11-02

## 2021-11-15 RX ORDER — ONDANSETRON HYDROCHLORIDE 4 MG/1
TAKE 1 TABLET BY ORAL ROUTE 3 TIMES A DAY AS NEEDED FOR 30 DAYS TABLET, FILM COATED ORAL
Qty: 60 | Refills: 2 | Status: ACTIVE | COMMUNITY

## 2021-11-15 RX ORDER — ATENOLOL 25 MG/1
TABLET ORAL
Qty: 0 | Refills: 0 | Status: ACTIVE | COMMUNITY
Start: 1900-01-01

## 2021-11-15 RX ORDER — MESALAMINE 1.2 G/1
TAKE 4 TABLETS BY MOUTH ONCE DAILY TABLET, DELAYED RELEASE ORAL ONCE A DAY
Qty: 360 | Refills: 3 | Status: ACTIVE | COMMUNITY
End: 2022-07-28

## 2021-11-18 ENCOUNTER — TELEPHONE ENCOUNTER (OUTPATIENT)
Dept: URBAN - METROPOLITAN AREA CLINIC 80 | Facility: CLINIC | Age: 61
End: 2021-11-18

## 2021-11-18 RX ORDER — ONDANSETRON HYDROCHLORIDE 4 MG/1
TAKE 1 TABLET BY ORAL ROUTE 3 TIMES A DAY AS NEEDED FOR NAUSEA TABLET, FILM COATED ORAL
Qty: 60 | Refills: 2

## 2021-11-30 ENCOUNTER — TELEPHONE ENCOUNTER (OUTPATIENT)
Dept: URBAN - METROPOLITAN AREA CLINIC 23 | Facility: CLINIC | Age: 61
End: 2021-11-30

## 2021-11-30 RX ORDER — BUDESONIDE 9 MG/1
1 TABLET IN THE MORNING TABLET, EXTENDED RELEASE ORAL ONCE A DAY
Qty: 60 TABLET | Refills: 0
Start: 2021-09-24 | End: 2022-01-29

## 2022-01-18 ENCOUNTER — OFFICE VISIT (OUTPATIENT)
Dept: URBAN - METROPOLITAN AREA CLINIC 80 | Facility: CLINIC | Age: 62
End: 2022-01-18

## 2022-01-18 RX ORDER — SUCRALFATE 1 G/1
TAKE 1 TABLET BY ORAL ROUTE 4 TIMES A DAY AS NEEDED FOR 30 DAYS TABLET ORAL
Qty: 120 | Refills: 2 | Status: ACTIVE | COMMUNITY
Start: 2017-06-22

## 2022-01-18 RX ORDER — ONDANSETRON HYDROCHLORIDE 4 MG/1
TAKE 1 TABLET BY ORAL ROUTE 3 TIMES A DAY AS NEEDED FOR NAUSEA TABLET, FILM COATED ORAL
Qty: 60 | Refills: 2 | Status: ACTIVE | COMMUNITY

## 2022-01-18 RX ORDER — HYDROCORTISONE ACETATE 25 MG/1
UNWRAP AND _INSERT 1 SUPPOSITORY RECTALLY TWICE A DAY SUPPOSITORY RECTAL
Qty: 8 | Refills: 1 | Status: ACTIVE | COMMUNITY
Start: 2018-05-17

## 2022-01-18 RX ORDER — ATENOLOL 25 MG/1
TABLET ORAL
Qty: 0 | Refills: 0 | Status: ACTIVE | COMMUNITY
Start: 1900-01-01

## 2022-01-18 RX ORDER — NORTRIPTYLINE HYDROCHLORIDE 10 MG/1
TAKE 2 CAPSULES (20 MG) BY ORAL ROUTE 4 TIMES PER DAY CAPSULE ORAL
Qty: 0 | Refills: 0 | Status: ACTIVE | COMMUNITY
Start: 1900-01-01

## 2022-01-18 RX ORDER — PROGESTERONE 100 MG/1
CAPSULE ORAL
Qty: 0 | Refills: 0 | Status: ACTIVE | COMMUNITY
Start: 1900-01-01

## 2022-01-18 RX ORDER — CIMETIDINE 200 MG/1
1 TABLET AS NEEDED TABLET, FILM COATED ORAL TWICE A DAY
Qty: 180 TABLET | Refills: 3 | Status: ACTIVE | COMMUNITY
Start: 2021-03-30

## 2022-01-18 RX ORDER — MESALAMINE 1.2 G/1
TAKE 4 TABLETS BY MOUTH ONCE DAILY TABLET, DELAYED RELEASE ORAL ONCE A DAY
Qty: 360 | Refills: 3 | Status: ACTIVE | COMMUNITY
End: 2022-07-28

## 2022-01-18 RX ORDER — BUDESONIDE 9 MG/1
1 TABLET IN THE MORNING TABLET, EXTENDED RELEASE ORAL ONCE A DAY
Qty: 60 TABLET | Refills: 0 | Status: ACTIVE | COMMUNITY
Start: 2021-09-24 | End: 2022-01-29

## 2022-02-17 ENCOUNTER — TELEPHONE ENCOUNTER (OUTPATIENT)
Dept: URBAN - METROPOLITAN AREA CLINIC 80 | Facility: CLINIC | Age: 62
End: 2022-02-17

## 2022-02-25 ENCOUNTER — ERX REFILL RESPONSE (OUTPATIENT)
Dept: URBAN - METROPOLITAN AREA CLINIC 80 | Facility: CLINIC | Age: 62
End: 2022-02-25

## 2022-02-25 ENCOUNTER — OFFICE VISIT (OUTPATIENT)
Dept: URBAN - METROPOLITAN AREA CLINIC 80 | Facility: CLINIC | Age: 62
End: 2022-02-25
Payer: COMMERCIAL

## 2022-02-25 VITALS
TEMPERATURE: 97.2 F | SYSTOLIC BLOOD PRESSURE: 137 MMHG | BODY MASS INDEX: 32.54 KG/M2 | WEIGHT: 190.6 LBS | HEIGHT: 64 IN | DIASTOLIC BLOOD PRESSURE: 72 MMHG

## 2022-02-25 DIAGNOSIS — R19.7 DIARRHEA: ICD-10-CM

## 2022-02-25 DIAGNOSIS — K21.9 GERD: ICD-10-CM

## 2022-02-25 DIAGNOSIS — K51.30 ULCERATIVE PROCTOSIGMOIDITIS: ICD-10-CM

## 2022-02-25 PROCEDURE — 99214 OFFICE O/P EST MOD 30 MIN: CPT | Performed by: INTERNAL MEDICINE

## 2022-02-25 RX ORDER — HYDROCORTISONE ACETATE 25 MG/1
UNWRAP AND _INSERT 1 SUPPOSITORY RECTALLY TWICE A DAY SUPPOSITORY RECTAL
Qty: 8 | Refills: 1 | Status: ACTIVE | COMMUNITY
Start: 2018-05-17

## 2022-02-25 RX ORDER — FAMOTIDINE 20 MG/1
TAKE 1 TABLET BY MOUTH TWICE DAILY TABLET, FILM COATED ORAL
Qty: 180 TABLET | Refills: 4 | OUTPATIENT

## 2022-02-25 RX ORDER — ONDANSETRON HYDROCHLORIDE 4 MG/1
TAKE 1 TABLET BY ORAL ROUTE 3 TIMES A DAY AS NEEDED FOR NAUSEA TABLET, FILM COATED ORAL
Qty: 60 | Refills: 2 | Status: ACTIVE | COMMUNITY

## 2022-02-25 RX ORDER — FAMOTIDINE 20 MG/1
TAKE 1 TABLET BY MOUTH TWICE DAILY TABLET, FILM COATED ORAL
Qty: 180 TABLET | Refills: 0 | OUTPATIENT

## 2022-02-25 RX ORDER — MESALAMINE 1.2 G/1
TAKE 4 TABLETS BY MOUTH ONCE DAILY TABLET, DELAYED RELEASE ORAL ONCE A DAY
Qty: 360 | Refills: 3 | Status: ACTIVE | COMMUNITY
End: 2022-07-28

## 2022-02-25 RX ORDER — SODIUM SULFATE, MAGNESIUM SULFATE, AND POTASSIUM CHLORIDE 17.75; 2.7; 2.25 G/1; G/1; G/1
12 TABLETS TABLET ORAL
Qty: 24 TABLETS | Refills: 0 | OUTPATIENT
Start: 2022-02-25 | End: 2022-02-26

## 2022-02-25 RX ORDER — HYDROCORTISONE 100 MG/60ML
60 ML IN THE EVENING SUSPENSION RECTAL
Qty: 900 | OUTPATIENT
Start: 2022-02-25 | End: 2022-03-27

## 2022-02-25 RX ORDER — NORTRIPTYLINE HYDROCHLORIDE 10 MG/1
TAKE 2 CAPSULES (20 MG) BY ORAL ROUTE 4 TIMES PER DAY CAPSULE ORAL
Qty: 0 | Refills: 0 | Status: ACTIVE | COMMUNITY
Start: 1900-01-01

## 2022-02-25 RX ORDER — ATENOLOL 25 MG/1
TABLET ORAL
Qty: 0 | Refills: 0 | Status: ACTIVE | COMMUNITY
Start: 1900-01-01

## 2022-02-25 RX ORDER — CIMETIDINE 200 MG/1
1 TABLET AS NEEDED TABLET, FILM COATED ORAL TWICE A DAY
Qty: 180 TABLET | Refills: 3 | Status: ACTIVE | COMMUNITY
Start: 2021-03-30

## 2022-02-25 RX ORDER — SUCRALFATE 1 G/1
TAKE 1 TABLET BY ORAL ROUTE 4 TIMES A DAY AS NEEDED FOR 30 DAYS TABLET ORAL
Qty: 120 | Refills: 2 | Status: ACTIVE | COMMUNITY
Start: 2017-06-22

## 2022-02-25 RX ORDER — PROGESTERONE 100 MG/1
CAPSULE ORAL
Qty: 0 | Refills: 0 | Status: ACTIVE | COMMUNITY
Start: 1900-01-01

## 2022-02-26 LAB
A/G RATIO: 2.2
ALBUMIN: 4.6
ALKALINE PHOSPHATASE: 77
ALT (SGPT): 19
AST (SGOT): 22
BASO (ABSOLUTE): 0
BASOS: 0
BILIRUBIN, TOTAL: 0.3
BUN/CREATININE RATIO: 16
BUN: 13
C-REACTIVE PROTEIN, QUANT: 3
CALCIUM: 10.1
CARBON DIOXIDE, TOTAL: 24
CHLORIDE: 103
CREATININE: 0.8
EGFR IF AFRICN AM: 93
EGFR IF NONAFRICN AM: 80
EOS (ABSOLUTE): 0.1
EOS: 1
GLOBULIN, TOTAL: 2.1
GLUCOSE: 74
HEMATOCRIT: 39.4
HEMATOLOGY COMMENTS:: (no result)
HEMOGLOBIN: 13
IMMATURE CELLS: (no result)
IMMATURE GRANS (ABS): 0
IMMATURE GRANULOCYTES: 1
LYMPHS (ABSOLUTE): 2.7
LYMPHS: 35
MCH: 30
MCHC: 33
MCV: 91
MONOCYTES(ABSOLUTE): 0.6
MONOCYTES: 8
NEUTROPHILS (ABSOLUTE): 4.2
NEUTROPHILS: 55
NRBC: (no result)
PLATELETS: 307
POTASSIUM: 4.6
PROTEIN, TOTAL: 6.7
RBC: 4.34
RDW: 12.4
SODIUM: 141
WBC: 7.7

## 2022-03-04 ENCOUNTER — WEB ENCOUNTER (OUTPATIENT)
Dept: URBAN - METROPOLITAN AREA CLINIC 80 | Facility: CLINIC | Age: 62
End: 2022-03-04

## 2022-03-04 RX ORDER — VEDOLIZUMAB 300 MG/5ML
AS DIRECTED INJECTION, POWDER, LYOPHILIZED, FOR SOLUTION INTRAVENOUS
Qty: 300 MILLIGRAMS | Refills: 0 | OUTPATIENT
Start: 2022-03-16 | End: 2022-06-14

## 2022-03-08 ENCOUNTER — WEB ENCOUNTER (OUTPATIENT)
Dept: URBAN - METROPOLITAN AREA CLINIC 80 | Facility: CLINIC | Age: 62
End: 2022-03-08

## 2022-03-14 ENCOUNTER — TELEPHONE ENCOUNTER (OUTPATIENT)
Dept: URBAN - METROPOLITAN AREA CLINIC 80 | Facility: CLINIC | Age: 62
End: 2022-03-14

## 2022-04-18 ENCOUNTER — TELEPHONE ENCOUNTER (OUTPATIENT)
Dept: URBAN - METROPOLITAN AREA CLINIC 80 | Facility: CLINIC | Age: 62
End: 2022-04-18

## 2022-04-18 ENCOUNTER — WEB ENCOUNTER (OUTPATIENT)
Dept: URBAN - METROPOLITAN AREA CLINIC 80 | Facility: CLINIC | Age: 62
End: 2022-04-18

## 2022-04-19 ENCOUNTER — ERX REFILL RESPONSE (OUTPATIENT)
Dept: URBAN - METROPOLITAN AREA CLINIC 80 | Facility: CLINIC | Age: 62
End: 2022-04-19

## 2022-04-19 RX ORDER — HYDROCORTISONE 100 MG/60ML
INSTILL 60 ML RECTALLY IN THE EVENING AS DIRECTED ENEMA RECTAL
Qty: 1260 MILLILITER | Refills: 0 | OUTPATIENT

## 2022-04-19 RX ORDER — HYDROCORTISONE 100 MG/60ML
INSTILL 60 ML RECTALLY IN THE EVENING AS DIRECTED ENEMA RECTAL
Qty: 1260 MILLILITER | Refills: 1 | OUTPATIENT

## 2022-05-13 ENCOUNTER — OFFICE VISIT (OUTPATIENT)
Dept: URBAN - METROPOLITAN AREA CLINIC 80 | Facility: CLINIC | Age: 62
End: 2022-05-13

## 2022-05-13 RX ORDER — ATENOLOL 25 MG/1
TABLET ORAL
Qty: 0 | Refills: 0 | Status: ACTIVE | COMMUNITY
Start: 1900-01-01

## 2022-05-13 RX ORDER — SUCRALFATE 1 G/1
TAKE 1 TABLET BY ORAL ROUTE 4 TIMES A DAY AS NEEDED FOR 30 DAYS TABLET ORAL
Qty: 120 | Refills: 2 | Status: ACTIVE | COMMUNITY
Start: 2017-06-22

## 2022-05-13 RX ORDER — PROGESTERONE 100 MG/1
CAPSULE ORAL
Qty: 0 | Refills: 0 | Status: ACTIVE | COMMUNITY
Start: 1900-01-01

## 2022-05-13 RX ORDER — MESALAMINE 1.2 G/1
TAKE 4 TABLETS BY MOUTH ONCE DAILY TABLET, DELAYED RELEASE ORAL ONCE A DAY
Qty: 360 | Refills: 3 | Status: ACTIVE | COMMUNITY
End: 2022-07-28

## 2022-05-13 RX ORDER — FAMOTIDINE 20 MG/1
TAKE 1 TABLET BY MOUTH TWICE DAILY TABLET, FILM COATED ORAL
Qty: 180 TABLET | Refills: 4 | Status: ACTIVE | COMMUNITY

## 2022-05-13 RX ORDER — NORTRIPTYLINE HYDROCHLORIDE 10 MG/1
TAKE 2 CAPSULES (20 MG) BY ORAL ROUTE 4 TIMES PER DAY CAPSULE ORAL
Qty: 0 | Refills: 0 | Status: ACTIVE | COMMUNITY
Start: 1900-01-01

## 2022-05-13 RX ORDER — HYDROCORTISONE ACETATE 25 MG/1
UNWRAP AND _INSERT 1 SUPPOSITORY RECTALLY TWICE A DAY SUPPOSITORY RECTAL
Qty: 8 | Refills: 1 | Status: ACTIVE | COMMUNITY
Start: 2018-05-17

## 2022-05-13 RX ORDER — ONDANSETRON HYDROCHLORIDE 4 MG/1
TAKE 1 TABLET BY ORAL ROUTE 3 TIMES A DAY AS NEEDED FOR NAUSEA TABLET, FILM COATED ORAL
Qty: 60 | Refills: 2 | Status: ACTIVE | COMMUNITY

## 2022-05-13 RX ORDER — CIMETIDINE 200 MG/1
1 TABLET AS NEEDED TABLET, FILM COATED ORAL TWICE A DAY
Qty: 180 TABLET | Refills: 3 | Status: ACTIVE | COMMUNITY
Start: 2021-03-30

## 2022-05-13 RX ORDER — VEDOLIZUMAB 300 MG/5ML
AS DIRECTED INJECTION, POWDER, LYOPHILIZED, FOR SOLUTION INTRAVENOUS
Qty: 300 MILLIGRAMS | Refills: 0 | Status: ACTIVE | COMMUNITY
Start: 2022-03-16 | End: 2022-06-14

## 2022-06-13 ENCOUNTER — WEB ENCOUNTER (OUTPATIENT)
Dept: URBAN - METROPOLITAN AREA CLINIC 80 | Facility: CLINIC | Age: 62
End: 2022-06-13

## 2022-06-14 ENCOUNTER — WEB ENCOUNTER (OUTPATIENT)
Dept: URBAN - METROPOLITAN AREA CLINIC 80 | Facility: CLINIC | Age: 62
End: 2022-06-14

## 2022-06-20 NOTE — HPI-TODAY'S VISIT:
Pt scheduled, Son Nicolas Miller Notified. 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   last seen in 9/2021 previously stable on lialda has had active disease requiring prednisone Colon in 1/15/2021 with normal TI biospies. active colitis on colon biopsies.   started on imuran 150mg qday had side effects of nausea, arthritis, and fatigue discussed biologic and had labs in 8/2021 with normal TB and viral hep b/c labs she wanted to hold off biologic trial and has been taking steroid enemas here for follow up A copy of this document will be sent to the referring provider.   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   colon in 1/2021 with normal TI and signs of active colitis discussed needing to advance tx to biologic but she had reservations improving on prednisone but unable to wean off started imuran with improvement in her bm and was able to stop prednisone developed diffuse arthritis, fatigue and nausea  had pt stop the imuran much better  on entocort but with ongoing symptoms she declined sgarting biologic in 9/2021 she comes in with improved but ongoing symptoms notes 3-5 bm/day  mostly with meals she is on hydrocortisone enemas daily no blood no abd pain no mucus energy improved stable weight normal appetite.  no other complaints.

## 2022-06-22 ENCOUNTER — TELEPHONE ENCOUNTER (OUTPATIENT)
Dept: URBAN - METROPOLITAN AREA CLINIC 80 | Facility: CLINIC | Age: 62
End: 2022-06-22

## 2022-06-23 ENCOUNTER — WEB ENCOUNTER (OUTPATIENT)
Dept: URBAN - METROPOLITAN AREA CLINIC 80 | Facility: CLINIC | Age: 62
End: 2022-06-23

## 2022-06-23 RX ORDER — BUDESONIDE 9 MG/1
1 TABLET IN THE MORNING TABLET, EXTENDED RELEASE ORAL ONCE A DAY
Qty: 60 TABLET | Refills: 0 | OUTPATIENT
Start: 2022-06-23 | End: 2022-08-22

## 2022-06-30 ENCOUNTER — WEB ENCOUNTER (OUTPATIENT)
Dept: URBAN - METROPOLITAN AREA CLINIC 80 | Facility: CLINIC | Age: 62
End: 2022-06-30

## 2022-07-05 ENCOUNTER — WEB ENCOUNTER (OUTPATIENT)
Dept: URBAN - METROPOLITAN AREA CLINIC 80 | Facility: CLINIC | Age: 62
End: 2022-07-05

## 2022-07-14 ENCOUNTER — WEB ENCOUNTER (OUTPATIENT)
Dept: URBAN - METROPOLITAN AREA CLINIC 128 | Facility: CLINIC | Age: 62
End: 2022-07-14

## 2022-07-20 ENCOUNTER — OFFICE VISIT (OUTPATIENT)
Dept: URBAN - METROPOLITAN AREA CLINIC 128 | Facility: CLINIC | Age: 62
End: 2022-07-20
Payer: COMMERCIAL

## 2022-07-20 VITALS
SYSTOLIC BLOOD PRESSURE: 146 MMHG | HEIGHT: 63 IN | DIASTOLIC BLOOD PRESSURE: 88 MMHG | HEART RATE: 73 BPM | BODY MASS INDEX: 32.25 KG/M2 | TEMPERATURE: 97.8 F | WEIGHT: 182 LBS

## 2022-07-20 DIAGNOSIS — Z12.11 COLON CANCER SCREENING: ICD-10-CM

## 2022-07-20 DIAGNOSIS — K51.30 ULCERATIVE PROCTOSIGMOIDITIS: ICD-10-CM

## 2022-07-20 DIAGNOSIS — K21.9 GERD: ICD-10-CM

## 2022-07-20 DIAGNOSIS — R19.7 DIARRHEA: ICD-10-CM

## 2022-07-20 PROBLEM — 235595009 GASTROESOPHAGEAL REFLUX DISEASE: Status: ACTIVE | Noted: 2020-11-02

## 2022-07-20 PROCEDURE — 99214 OFFICE O/P EST MOD 30 MIN: CPT | Performed by: INTERNAL MEDICINE

## 2022-07-20 RX ORDER — BUDESONIDE 9 MG/1
1 TABLET IN THE MORNING TABLET, EXTENDED RELEASE ORAL ONCE A DAY
Qty: 60 TABLET | Refills: 0 | Status: ACTIVE | COMMUNITY
Start: 2022-06-23 | End: 2022-08-22

## 2022-07-20 RX ORDER — HYDROCORTISONE ACETATE 25 MG/1
UNWRAP AND _INSERT 1 SUPPOSITORY RECTALLY TWICE A DAY SUPPOSITORY RECTAL
Qty: 8 | Refills: 1 | Status: ACTIVE | COMMUNITY
Start: 2018-05-17

## 2022-07-20 RX ORDER — NORTRIPTYLINE HYDROCHLORIDE 10 MG/1
TAKE 2 CAPSULES (20 MG) BY ORAL ROUTE 4 TIMES PER DAY CAPSULE ORAL
Qty: 0 | Refills: 0 | Status: ACTIVE | COMMUNITY
Start: 1900-01-01

## 2022-07-20 RX ORDER — FAMOTIDINE 20 MG/1
TAKE 1 TABLET BY MOUTH TWICE DAILY TABLET, FILM COATED ORAL
Qty: 180 TABLET | Refills: 4 | Status: ACTIVE | COMMUNITY

## 2022-07-20 RX ORDER — MESALAMINE 1.2 G/1
TAKE 4 TABLETS BY MOUTH ONCE DAILY TABLET, DELAYED RELEASE ORAL ONCE A DAY
Qty: 360 | Refills: 3 | Status: ACTIVE | COMMUNITY
End: 2022-07-28

## 2022-07-20 RX ORDER — SUCRALFATE 1 G/1
TAKE 1 TABLET BY ORAL ROUTE 4 TIMES A DAY AS NEEDED FOR 30 DAYS TABLET ORAL
Qty: 120 | Refills: 2 | Status: ACTIVE | COMMUNITY
Start: 2017-06-22

## 2022-07-20 RX ORDER — ATENOLOL 25 MG/1
TABLET ORAL
Qty: 0 | Refills: 0 | Status: ACTIVE | COMMUNITY
Start: 1900-01-01

## 2022-07-20 RX ORDER — CIMETIDINE 200 MG/1
1 TABLET AS NEEDED TABLET, FILM COATED ORAL TWICE A DAY
Qty: 180 TABLET | Refills: 3 | Status: ACTIVE | COMMUNITY
Start: 2021-03-30

## 2022-07-20 RX ORDER — HYDROCORTISONE 100 MG/60ML
INSTILL 60 ML RECTALLY IN THE EVENING AS DIRECTED ENEMA RECTAL
Qty: 1260 MILLILITER | Refills: 1 | Status: ACTIVE | COMMUNITY

## 2022-07-20 RX ORDER — PROGESTERONE 100 MG/1
CAPSULE ORAL
Qty: 0 | Refills: 0 | Status: ACTIVE | COMMUNITY
Start: 1900-01-01

## 2022-07-20 RX ORDER — ONDANSETRON HYDROCHLORIDE 4 MG/1
TAKE 1 TABLET BY ORAL ROUTE 3 TIMES A DAY AS NEEDED FOR NAUSEA TABLET, FILM COATED ORAL
Qty: 60 | Refills: 2 | Status: ACTIVE | COMMUNITY

## 2022-07-20 NOTE — HPI-TODAY'S VISIT:
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 active colitis on colon biopsies.   started on imuran 150mg qday had side effects of nausea, arthritis, and fatigue discussed biologic and had labs in 7/2021 with normal TB and viral hep b/c labs she wanted to hold off biologic trial and has been taking steroid enemas decided t proceed with entyvio in 2/2022 here for follow up A copy of this document will be sent to the referring provider.   she has not started entyvio.  still waiting for insurance coverage to start has been on uceris and doing well  last colon in 1/2021 with normal TI and signs of active colitis discussed needing to advance tx to biologic but she had reservations has had recurrent flares requiring prednisone previously started imuran with improvement in her bm and was able to stop prednisone developed diffuse arthritis, fatigue and nausea  had pt stop the imuran today she is asymptomatic notes 1-2 soft bm/day no bleeding or mucus no abd pain normal appetite good energy stable weight  no other complaints.

## 2022-07-24 LAB
A/G RATIO: 1.8
ALBUMIN: 4.2
ALKALINE PHOSPHATASE: 69
ALT (SGPT): 11
AST (SGOT): 15
BILIRUBIN, TOTAL: 0.5
BUN/CREATININE RATIO: (no result)
BUN: 17
C-REACTIVE PROTEIN, QUANT: 1.6
CALCIUM: 9.3
CARBON DIOXIDE, TOTAL: 27
CHLORIDE: 105
CREATININE: 0.79
EGFR: 86
GLOBULIN, TOTAL: 2.3
GLUCOSE: 87
HEMATOCRIT: 38.2
HEMOGLOBIN: 12.5
MCH: 29.5
MCHC: 32.7
MCV: 90.1
MITOGEN-NIL: >10
MPV: 10.5
NIL: 0.08
PLATELET COUNT: 254
POTASSIUM: 4.3
PROTEIN, TOTAL: 6.5
QUANTIFERON(R)-TB GOLD: NEGATIVE
RDW: 12.6
RED BLOOD CELL COUNT: 4.24
SODIUM: 139
TB1-NIL: <0
TB2-NIL: <0
WHITE BLOOD CELL COUNT: 6.9

## 2022-07-26 ENCOUNTER — OFFICE VISIT (OUTPATIENT)
Dept: URBAN - METROPOLITAN AREA SURGERY CENTER 19 | Facility: SURGERY CENTER | Age: 62
End: 2022-07-26

## 2022-07-27 ENCOUNTER — TELEPHONE ENCOUNTER (OUTPATIENT)
Dept: URBAN - METROPOLITAN AREA CLINIC 92 | Facility: CLINIC | Age: 62
End: 2022-07-27

## 2022-07-27 RX ORDER — FAMOTIDINE 20 MG/1
TAKE 1 TABLET BY MOUTH TWICE DAILY TABLET, FILM COATED ORAL
OUTPATIENT

## 2022-07-27 RX ORDER — CIMETIDINE 200 MG/1
1 TABLET AS NEEDED TABLET, FILM COATED ORAL TWICE A DAY
Qty: 180 TABLET | Refills: 3 | OUTPATIENT
Start: 2022-08-10

## 2022-07-28 ENCOUNTER — OFFICE VISIT (OUTPATIENT)
Dept: URBAN - METROPOLITAN AREA CLINIC 79 | Facility: CLINIC | Age: 62
End: 2022-07-28
Payer: COMMERCIAL

## 2022-07-28 VITALS
HEART RATE: 67 BPM | DIASTOLIC BLOOD PRESSURE: 77 MMHG | SYSTOLIC BLOOD PRESSURE: 132 MMHG | RESPIRATION RATE: 20 BRPM | TEMPERATURE: 97.7 F | BODY MASS INDEX: 32.07 KG/M2 | HEIGHT: 63 IN | WEIGHT: 181 LBS

## 2022-07-28 DIAGNOSIS — K51.90 ACUTE ULCERATIVE COLITIS: ICD-10-CM

## 2022-07-28 PROCEDURE — 96413 CHEMO IV INFUSION 1 HR: CPT | Performed by: INTERNAL MEDICINE

## 2022-07-28 RX ORDER — HYDROCORTISONE 100 MG/60ML
INSTILL 60 ML RECTALLY IN THE EVENING AS DIRECTED ENEMA RECTAL
Qty: 1260 MILLILITER | Refills: 1 | Status: ACTIVE | COMMUNITY

## 2022-07-28 RX ORDER — ONDANSETRON HYDROCHLORIDE 4 MG/1
TAKE 1 TABLET BY ORAL ROUTE 3 TIMES A DAY AS NEEDED FOR NAUSEA TABLET, FILM COATED ORAL
Qty: 60 | Refills: 2 | Status: ACTIVE | COMMUNITY

## 2022-07-28 RX ORDER — BUDESONIDE 9 MG/1
1 TABLET IN THE MORNING TABLET, EXTENDED RELEASE ORAL ONCE A DAY
Qty: 60 TABLET | Refills: 0 | Status: ACTIVE | COMMUNITY
Start: 2022-06-23 | End: 2022-08-22

## 2022-07-28 RX ORDER — MESALAMINE 1.2 G/1
TAKE 4 TABLETS BY MOUTH ONCE DAILY TABLET, DELAYED RELEASE ORAL ONCE A DAY
Qty: 360 | Refills: 3 | Status: ACTIVE | COMMUNITY
End: 2022-07-28

## 2022-07-28 RX ORDER — PROGESTERONE 100 MG/1
CAPSULE ORAL
Qty: 0 | Refills: 0 | Status: ACTIVE | COMMUNITY
Start: 1900-01-01

## 2022-07-28 RX ORDER — HYDROCORTISONE ACETATE 25 MG/1
UNWRAP AND _INSERT 1 SUPPOSITORY RECTALLY TWICE A DAY SUPPOSITORY RECTAL
Qty: 8 | Refills: 1 | Status: ACTIVE | COMMUNITY
Start: 2018-05-17

## 2022-07-28 RX ORDER — ATENOLOL 25 MG/1
TABLET ORAL
Qty: 0 | Refills: 0 | Status: ACTIVE | COMMUNITY
Start: 1900-01-01

## 2022-07-28 RX ORDER — CIMETIDINE 200 MG/1
1 TABLET AS NEEDED TABLET, FILM COATED ORAL TWICE A DAY
Qty: 180 TABLET | Refills: 3 | Status: ACTIVE | COMMUNITY
Start: 2021-03-30

## 2022-07-28 RX ORDER — FAMOTIDINE 20 MG/1
TAKE 1 TABLET BY MOUTH TWICE DAILY TABLET, FILM COATED ORAL
Qty: 180 TABLET | Refills: 4 | Status: ACTIVE | COMMUNITY

## 2022-07-28 RX ORDER — NORTRIPTYLINE HYDROCHLORIDE 10 MG/1
TAKE 2 CAPSULES (20 MG) BY ORAL ROUTE 4 TIMES PER DAY CAPSULE ORAL
Qty: 0 | Refills: 0 | Status: ACTIVE | COMMUNITY
Start: 1900-01-01

## 2022-07-28 RX ORDER — SUCRALFATE 1 G/1
TAKE 1 TABLET BY ORAL ROUTE 4 TIMES A DAY AS NEEDED FOR 30 DAYS TABLET ORAL
Qty: 120 | Refills: 2 | Status: ACTIVE | COMMUNITY
Start: 2017-06-22

## 2022-08-11 ENCOUNTER — OFFICE VISIT (OUTPATIENT)
Dept: URBAN - METROPOLITAN AREA CLINIC 79 | Facility: CLINIC | Age: 62
End: 2022-08-11
Payer: COMMERCIAL

## 2022-08-11 VITALS
WEIGHT: 181 LBS | HEART RATE: 72 BPM | RESPIRATION RATE: 20 BRPM | HEIGHT: 63 IN | TEMPERATURE: 97.7 F | SYSTOLIC BLOOD PRESSURE: 141 MMHG | BODY MASS INDEX: 32.07 KG/M2 | DIASTOLIC BLOOD PRESSURE: 83 MMHG

## 2022-08-11 DIAGNOSIS — K51.80 CHRONIC PANCOLONIC ULCERATIVE COLITIS: ICD-10-CM

## 2022-08-11 PROCEDURE — 96413 CHEMO IV INFUSION 1 HR: CPT | Performed by: INTERNAL MEDICINE

## 2022-08-11 RX ORDER — FAMOTIDINE 20 MG/1
TAKE 1 TABLET BY MOUTH TWICE DAILY TABLET, FILM COATED ORAL
Qty: 180 TABLET | Refills: 4 | Status: ACTIVE | COMMUNITY

## 2022-08-11 RX ORDER — ATENOLOL 25 MG/1
TABLET ORAL
Qty: 0 | Refills: 0 | Status: ACTIVE | COMMUNITY
Start: 1900-01-01

## 2022-08-11 RX ORDER — HYDROCORTISONE ACETATE 25 MG/1
UNWRAP AND _INSERT 1 SUPPOSITORY RECTALLY TWICE A DAY SUPPOSITORY RECTAL
Qty: 8 | Refills: 1 | Status: ACTIVE | COMMUNITY
Start: 2018-05-17

## 2022-08-11 RX ORDER — CIMETIDINE 200 MG/1
1 TABLET AS NEEDED TABLET, FILM COATED ORAL TWICE A DAY
Qty: 180 TABLET | Refills: 3 | Status: ACTIVE | COMMUNITY
Start: 2022-08-10

## 2022-08-11 RX ORDER — HYDROCORTISONE 100 MG/60ML
INSTILL 60 ML RECTALLY IN THE EVENING AS DIRECTED ENEMA RECTAL
Qty: 1260 MILLILITER | Refills: 1 | Status: ACTIVE | COMMUNITY

## 2022-08-11 RX ORDER — PROGESTERONE 100 MG/1
CAPSULE ORAL
Qty: 0 | Refills: 0 | Status: ACTIVE | COMMUNITY
Start: 1900-01-01

## 2022-08-11 RX ORDER — SUCRALFATE 1 G/1
TAKE 1 TABLET BY ORAL ROUTE 4 TIMES A DAY AS NEEDED FOR 30 DAYS TABLET ORAL
Qty: 120 | Refills: 2 | Status: ACTIVE | COMMUNITY
Start: 2017-06-22

## 2022-08-11 RX ORDER — CIMETIDINE 200 MG/1
1 TABLET AS NEEDED TABLET, FILM COATED ORAL TWICE A DAY
Qty: 180 TABLET | Refills: 3 | Status: ACTIVE | COMMUNITY
Start: 2021-03-30

## 2022-08-11 RX ORDER — BUDESONIDE 9 MG/1
1 TABLET IN THE MORNING TABLET, EXTENDED RELEASE ORAL ONCE A DAY
Qty: 60 TABLET | Refills: 0 | Status: ACTIVE | COMMUNITY
Start: 2022-06-23 | End: 2022-08-22

## 2022-08-11 RX ORDER — NORTRIPTYLINE HYDROCHLORIDE 10 MG/1
TAKE 2 CAPSULES (20 MG) BY ORAL ROUTE 4 TIMES PER DAY CAPSULE ORAL
Qty: 0 | Refills: 0 | Status: ACTIVE | COMMUNITY
Start: 1900-01-01

## 2022-08-11 RX ORDER — ONDANSETRON HYDROCHLORIDE 4 MG/1
TAKE 1 TABLET BY ORAL ROUTE 3 TIMES A DAY AS NEEDED FOR NAUSEA TABLET, FILM COATED ORAL
Qty: 60 | Refills: 2 | Status: ACTIVE | COMMUNITY

## 2022-09-13 ENCOUNTER — OFFICE VISIT (OUTPATIENT)
Dept: URBAN - METROPOLITAN AREA CLINIC 79 | Facility: CLINIC | Age: 62
End: 2022-09-13
Payer: COMMERCIAL

## 2022-09-13 VITALS
HEART RATE: 77 BPM | SYSTOLIC BLOOD PRESSURE: 149 MMHG | WEIGHT: 184 LBS | DIASTOLIC BLOOD PRESSURE: 89 MMHG | RESPIRATION RATE: 18 BRPM | HEIGHT: 63 IN | TEMPERATURE: 97.3 F | BODY MASS INDEX: 32.6 KG/M2

## 2022-09-13 DIAGNOSIS — K51.90 ACUTE ULCERATIVE COLITIS: ICD-10-CM

## 2022-09-13 PROCEDURE — 96413 CHEMO IV INFUSION 1 HR: CPT | Performed by: INTERNAL MEDICINE

## 2022-09-13 RX ORDER — HYDROCORTISONE 100 MG/60ML
INSTILL 60 ML RECTALLY IN THE EVENING AS DIRECTED ENEMA RECTAL
Qty: 1260 MILLILITER | Refills: 1 | Status: ACTIVE | COMMUNITY

## 2022-09-13 RX ORDER — FAMOTIDINE 20 MG/1
TAKE 1 TABLET BY MOUTH TWICE DAILY TABLET, FILM COATED ORAL
Qty: 180 TABLET | Refills: 4 | Status: ACTIVE | COMMUNITY

## 2022-09-13 RX ORDER — ATENOLOL 25 MG/1
TABLET ORAL
Qty: 0 | Refills: 0 | Status: ACTIVE | COMMUNITY
Start: 1900-01-01

## 2022-09-13 RX ORDER — NORTRIPTYLINE HYDROCHLORIDE 10 MG/1
TAKE 2 CAPSULES (20 MG) BY ORAL ROUTE 4 TIMES PER DAY CAPSULE ORAL
Qty: 0 | Refills: 0 | Status: ACTIVE | COMMUNITY
Start: 1900-01-01

## 2022-09-13 RX ORDER — CIMETIDINE 200 MG/1
1 TABLET AS NEEDED TABLET, FILM COATED ORAL TWICE A DAY
Qty: 180 TABLET | Refills: 3 | Status: ACTIVE | COMMUNITY
Start: 2022-08-10

## 2022-09-13 RX ORDER — HYDROCORTISONE ACETATE 25 MG/1
UNWRAP AND _INSERT 1 SUPPOSITORY RECTALLY TWICE A DAY SUPPOSITORY RECTAL
Qty: 8 | Refills: 1 | Status: ACTIVE | COMMUNITY
Start: 2018-05-17

## 2022-09-13 RX ORDER — ONDANSETRON HYDROCHLORIDE 4 MG/1
TAKE 1 TABLET BY ORAL ROUTE 3 TIMES A DAY AS NEEDED FOR NAUSEA TABLET, FILM COATED ORAL
Qty: 60 | Refills: 2 | Status: ACTIVE | COMMUNITY

## 2022-09-13 RX ORDER — CIMETIDINE 200 MG/1
1 TABLET AS NEEDED TABLET, FILM COATED ORAL TWICE A DAY
Qty: 180 TABLET | Refills: 3 | Status: ACTIVE | COMMUNITY
Start: 2021-03-30

## 2022-09-13 RX ORDER — SUCRALFATE 1 G/1
TAKE 1 TABLET BY ORAL ROUTE 4 TIMES A DAY AS NEEDED FOR 30 DAYS TABLET ORAL
Qty: 120 | Refills: 2 | Status: ACTIVE | COMMUNITY
Start: 2017-06-22

## 2022-09-13 RX ORDER — PROGESTERONE 100 MG/1
CAPSULE ORAL
Qty: 0 | Refills: 0 | Status: ACTIVE | COMMUNITY
Start: 1900-01-01

## 2022-10-27 ENCOUNTER — TELEPHONE ENCOUNTER (OUTPATIENT)
Dept: URBAN - METROPOLITAN AREA CLINIC 128 | Facility: CLINIC | Age: 62
End: 2022-10-27

## 2022-10-27 RX ORDER — MESALAMINE 1.2 G/1
TAKE 4 TABLET BY ORAL ROUTE DAILY FOR 90 DAYS TABLET, DELAYED RELEASE ORAL 1
Qty: 360 | Refills: 3 | OUTPATIENT
Start: 2022-10-28 | End: 2023-10-23

## 2022-10-28 ENCOUNTER — WEB ENCOUNTER (OUTPATIENT)
Dept: URBAN - METROPOLITAN AREA CLINIC 80 | Facility: CLINIC | Age: 62
End: 2022-10-28

## 2022-11-08 ENCOUNTER — OFFICE VISIT (OUTPATIENT)
Dept: URBAN - METROPOLITAN AREA CLINIC 79 | Facility: CLINIC | Age: 62
End: 2022-11-08
Payer: COMMERCIAL

## 2022-11-08 VITALS
DIASTOLIC BLOOD PRESSURE: 68 MMHG | SYSTOLIC BLOOD PRESSURE: 121 MMHG | BODY MASS INDEX: 32.78 KG/M2 | RESPIRATION RATE: 18 BRPM | TEMPERATURE: 97.7 F | WEIGHT: 185 LBS | HEART RATE: 98 BPM | HEIGHT: 63 IN

## 2022-11-08 DIAGNOSIS — K51.80 CHRONIC PANCOLONIC ULCERATIVE COLITIS: ICD-10-CM

## 2022-11-08 PROCEDURE — 96413 CHEMO IV INFUSION 1 HR: CPT | Performed by: INTERNAL MEDICINE

## 2022-11-08 RX ORDER — MESALAMINE 1.2 G/1
TAKE 4 TABLET BY ORAL ROUTE DAILY FOR 90 DAYS TABLET, DELAYED RELEASE ORAL 1
Qty: 360 | Refills: 3 | Status: ACTIVE | COMMUNITY
Start: 2022-10-28 | End: 2023-10-23

## 2022-11-08 RX ORDER — SUCRALFATE 1 G/1
TAKE 1 TABLET BY ORAL ROUTE 4 TIMES A DAY AS NEEDED FOR 30 DAYS TABLET ORAL
Qty: 120 | Refills: 2 | Status: ACTIVE | COMMUNITY
Start: 2017-06-22

## 2022-11-08 RX ORDER — CIMETIDINE 200 MG/1
1 TABLET AS NEEDED TABLET, FILM COATED ORAL TWICE A DAY
Qty: 180 TABLET | Refills: 3 | Status: ACTIVE | COMMUNITY
Start: 2021-03-30

## 2022-11-08 RX ORDER — ONDANSETRON HYDROCHLORIDE 4 MG/1
TAKE 1 TABLET BY ORAL ROUTE 3 TIMES A DAY AS NEEDED FOR NAUSEA TABLET, FILM COATED ORAL
Qty: 60 | Refills: 2 | Status: ACTIVE | COMMUNITY

## 2022-11-08 RX ORDER — ATENOLOL 25 MG/1
TABLET ORAL
Qty: 0 | Refills: 0 | Status: ACTIVE | COMMUNITY
Start: 1900-01-01

## 2022-11-08 RX ORDER — NORTRIPTYLINE HYDROCHLORIDE 10 MG/1
TAKE 2 CAPSULES (20 MG) BY ORAL ROUTE 4 TIMES PER DAY CAPSULE ORAL
Qty: 0 | Refills: 0 | Status: ACTIVE | COMMUNITY
Start: 1900-01-01

## 2022-11-08 RX ORDER — HYDROCORTISONE 100 MG/60ML
INSTILL 60 ML RECTALLY IN THE EVENING AS DIRECTED ENEMA RECTAL
Qty: 1260 MILLILITER | Refills: 1 | Status: ACTIVE | COMMUNITY

## 2022-11-08 RX ORDER — FAMOTIDINE 20 MG/1
TAKE 1 TABLET BY MOUTH TWICE DAILY TABLET, FILM COATED ORAL
Qty: 180 TABLET | Refills: 4 | Status: ACTIVE | COMMUNITY

## 2022-11-08 RX ORDER — HYDROCORTISONE ACETATE 25 MG/1
UNWRAP AND _INSERT 1 SUPPOSITORY RECTALLY TWICE A DAY SUPPOSITORY RECTAL
Qty: 8 | Refills: 1 | Status: ACTIVE | COMMUNITY
Start: 2018-05-17

## 2022-11-08 RX ORDER — PROGESTERONE 100 MG/1
CAPSULE ORAL
Qty: 0 | Refills: 0 | Status: ACTIVE | COMMUNITY
Start: 1900-01-01

## 2022-11-08 RX ORDER — CIMETIDINE 200 MG/1
1 TABLET AS NEEDED TABLET, FILM COATED ORAL TWICE A DAY
Qty: 180 TABLET | Refills: 3 | Status: ACTIVE | COMMUNITY
Start: 2022-08-10

## 2022-11-09 ENCOUNTER — ERX REFILL RESPONSE (OUTPATIENT)
Dept: URBAN - METROPOLITAN AREA CLINIC 80 | Facility: CLINIC | Age: 62
End: 2022-11-09

## 2022-11-09 RX ORDER — MESALAMINE 1.2 G/1
TAKE 4 TABLETS BY MOUTH EVERY DAY TABLET, DELAYED RELEASE ORAL
Qty: 360 TABLET | Refills: 3 | OUTPATIENT

## 2022-11-09 RX ORDER — MESALAMINE 1.2 G/1
TAKE 4 TABLET BY ORAL ROUTE DAILY FOR 90 DAYS TABLET, DELAYED RELEASE ORAL 1
Qty: 360 | Refills: 3 | OUTPATIENT

## 2023-01-03 ENCOUNTER — OFFICE VISIT (OUTPATIENT)
Dept: URBAN - METROPOLITAN AREA CLINIC 79 | Facility: CLINIC | Age: 63
End: 2023-01-03

## 2023-01-06 ENCOUNTER — OFFICE VISIT (OUTPATIENT)
Dept: URBAN - METROPOLITAN AREA CLINIC 79 | Facility: CLINIC | Age: 63
End: 2023-01-06
Payer: COMMERCIAL

## 2023-01-06 VITALS
BODY MASS INDEX: 32.78 KG/M2 | WEIGHT: 185 LBS | TEMPERATURE: 98.1 F | DIASTOLIC BLOOD PRESSURE: 81 MMHG | HEIGHT: 63 IN | SYSTOLIC BLOOD PRESSURE: 131 MMHG | RESPIRATION RATE: 20 BRPM | HEART RATE: 80 BPM

## 2023-01-06 DIAGNOSIS — K51.80 CHRONIC PANCOLONIC ULCERATIVE COLITIS: ICD-10-CM

## 2023-01-06 PROCEDURE — 96413 CHEMO IV INFUSION 1 HR: CPT | Performed by: INTERNAL MEDICINE

## 2023-01-06 RX ORDER — SUCRALFATE 1 G/1
TAKE 1 TABLET BY ORAL ROUTE 4 TIMES A DAY AS NEEDED FOR 30 DAYS TABLET ORAL
Qty: 120 | Refills: 2 | Status: ACTIVE | COMMUNITY
Start: 2017-06-22

## 2023-01-06 RX ORDER — HYDROCORTISONE ACETATE 25 MG/1
UNWRAP AND _INSERT 1 SUPPOSITORY RECTALLY TWICE A DAY SUPPOSITORY RECTAL
Qty: 8 | Refills: 1 | Status: ACTIVE | COMMUNITY
Start: 2018-05-17

## 2023-01-06 RX ORDER — ONDANSETRON HYDROCHLORIDE 4 MG/1
TAKE 1 TABLET BY ORAL ROUTE 3 TIMES A DAY AS NEEDED FOR NAUSEA TABLET, FILM COATED ORAL
Qty: 60 | Refills: 2 | Status: ACTIVE | COMMUNITY

## 2023-01-06 RX ORDER — CIMETIDINE 200 MG/1
1 TABLET AS NEEDED TABLET, FILM COATED ORAL TWICE A DAY
Qty: 180 TABLET | Refills: 3 | Status: ACTIVE | COMMUNITY
Start: 2021-03-30

## 2023-01-06 RX ORDER — NORTRIPTYLINE HYDROCHLORIDE 10 MG/1
TAKE 2 CAPSULES (20 MG) BY ORAL ROUTE 4 TIMES PER DAY CAPSULE ORAL
Qty: 0 | Refills: 0 | Status: ACTIVE | COMMUNITY
Start: 1900-01-01

## 2023-01-06 RX ORDER — HYDROCORTISONE 100 MG/60ML
INSTILL 60 ML RECTALLY IN THE EVENING AS DIRECTED ENEMA RECTAL
Qty: 1260 MILLILITER | Refills: 1 | Status: ACTIVE | COMMUNITY

## 2023-01-06 RX ORDER — PROGESTERONE 100 MG/1
CAPSULE ORAL
Qty: 0 | Refills: 0 | Status: ACTIVE | COMMUNITY
Start: 1900-01-01

## 2023-01-06 RX ORDER — FAMOTIDINE 20 MG/1
TAKE 1 TABLET BY MOUTH TWICE DAILY TABLET, FILM COATED ORAL
Qty: 180 TABLET | Refills: 4 | Status: ACTIVE | COMMUNITY

## 2023-01-06 RX ORDER — ATENOLOL 25 MG/1
TABLET ORAL
Qty: 0 | Refills: 0 | Status: ACTIVE | COMMUNITY
Start: 1900-01-01

## 2023-01-06 RX ORDER — CIMETIDINE 200 MG/1
1 TABLET AS NEEDED TABLET, FILM COATED ORAL TWICE A DAY
Qty: 180 TABLET | Refills: 3 | Status: ACTIVE | COMMUNITY
Start: 2022-08-10

## 2023-01-06 RX ORDER — MESALAMINE 1.2 G/1
TAKE 4 TABLETS BY MOUTH EVERY DAY TABLET, DELAYED RELEASE ORAL
Qty: 360 TABLET | Refills: 3 | Status: ACTIVE | COMMUNITY

## 2023-01-31 ENCOUNTER — TELEPHONE ENCOUNTER (OUTPATIENT)
Dept: URBAN - METROPOLITAN AREA CLINIC 128 | Facility: CLINIC | Age: 63
End: 2023-01-31

## 2023-01-31 RX ORDER — ATENOLOL 25 MG/1
TABLET ORAL
Qty: 0 | Refills: 0 | Status: ACTIVE | COMMUNITY
Start: 1900-01-01

## 2023-01-31 RX ORDER — MESALAMINE 1.2 G/1
TAKE 4 TABLETS BY MOUTH EVERY DAY TABLET, DELAYED RELEASE ORAL
Qty: 360 TABLET | Refills: 3 | Status: ACTIVE | COMMUNITY

## 2023-01-31 RX ORDER — HYDROCORTISONE ACETATE 25 MG/1
UNWRAP AND _INSERT 1 SUPPOSITORY RECTALLY TWICE A DAY SUPPOSITORY RECTAL
Qty: 8 | Refills: 1 | Status: ACTIVE | COMMUNITY
Start: 2018-05-17

## 2023-01-31 RX ORDER — NORTRIPTYLINE HYDROCHLORIDE 10 MG/1
TAKE 2 CAPSULES (20 MG) BY ORAL ROUTE 4 TIMES PER DAY CAPSULE ORAL
Qty: 0 | Refills: 0 | Status: ACTIVE | COMMUNITY
Start: 1900-01-01

## 2023-01-31 RX ORDER — ONDANSETRON HYDROCHLORIDE 4 MG/1
TAKE 1 TABLET BY ORAL ROUTE 3 TIMES A DAY AS NEEDED FOR NAUSEA TABLET, FILM COATED ORAL
Qty: 60 | Refills: 2 | Status: ACTIVE | COMMUNITY

## 2023-01-31 RX ORDER — PROGESTERONE 100 MG/1
CAPSULE ORAL
Qty: 0 | Refills: 0 | Status: ACTIVE | COMMUNITY
Start: 1900-01-01

## 2023-01-31 RX ORDER — CIMETIDINE 200 MG/1
1 TABLET AS NEEDED TABLET, FILM COATED ORAL TWICE A DAY
Qty: 180 TABLET | Refills: 3 | Status: ACTIVE | COMMUNITY
Start: 2021-03-30

## 2023-01-31 RX ORDER — CIMETIDINE 200 MG/1
1 TABLET AS NEEDED TABLET, FILM COATED ORAL TWICE A DAY
Qty: 180 TABLET | Refills: 3 | Status: ACTIVE | COMMUNITY
Start: 2022-08-10

## 2023-01-31 RX ORDER — BUDESONIDE 3 MG/1
3 CAPSULES CAPSULE, DELAYED RELEASE PELLETS ORAL ONCE A DAY
Qty: 180 CAPSULE | Refills: 0 | OUTPATIENT
Start: 2023-02-01

## 2023-01-31 RX ORDER — FAMOTIDINE 20 MG/1
TAKE 1 TABLET BY MOUTH TWICE DAILY TABLET, FILM COATED ORAL
Qty: 180 TABLET | Refills: 4 | Status: ACTIVE | COMMUNITY

## 2023-01-31 RX ORDER — SUCRALFATE 1 G/1
TAKE 1 TABLET BY ORAL ROUTE 4 TIMES A DAY AS NEEDED FOR 30 DAYS TABLET ORAL
Qty: 120 | Refills: 2 | Status: ACTIVE | COMMUNITY
Start: 2017-06-22

## 2023-01-31 RX ORDER — HYDROCORTISONE 100 MG/60ML
INSTILL 60 ML RECTALLY IN THE EVENING AS DIRECTED ENEMA RECTAL
Qty: 1260 MILLILITER | Refills: 1 | Status: ACTIVE | COMMUNITY

## 2023-02-01 PROBLEM — 52506002 ULCERATIVE PROCTOSIGMOIDITIS: Status: ACTIVE | Noted: 2020-11-02

## 2023-02-27 ENCOUNTER — OFFICE VISIT (OUTPATIENT)
Dept: URBAN - METROPOLITAN AREA CLINIC 79 | Facility: CLINIC | Age: 63
End: 2023-02-27
Payer: COMMERCIAL

## 2023-02-27 VITALS
HEART RATE: 81 BPM | TEMPERATURE: 97.5 F | WEIGHT: 182 LBS | HEIGHT: 63 IN | SYSTOLIC BLOOD PRESSURE: 147 MMHG | DIASTOLIC BLOOD PRESSURE: 81 MMHG | BODY MASS INDEX: 32.25 KG/M2 | RESPIRATION RATE: 18 BRPM

## 2023-02-27 DIAGNOSIS — K51.80 OTHER ULCERATIVE COLITIS: ICD-10-CM

## 2023-02-27 PROCEDURE — 96413 CHEMO IV INFUSION 1 HR: CPT | Performed by: INTERNAL MEDICINE

## 2023-02-27 RX ORDER — HYDROCORTISONE ACETATE 25 MG/1
UNWRAP AND _INSERT 1 SUPPOSITORY RECTALLY TWICE A DAY SUPPOSITORY RECTAL
Qty: 8 | Refills: 1 | Status: ACTIVE | COMMUNITY
Start: 2018-05-17

## 2023-02-27 RX ORDER — ONDANSETRON HYDROCHLORIDE 4 MG/1
TAKE 1 TABLET BY ORAL ROUTE 3 TIMES A DAY AS NEEDED FOR NAUSEA TABLET, FILM COATED ORAL
Qty: 60 | Refills: 2 | Status: ACTIVE | COMMUNITY

## 2023-02-27 RX ORDER — NORTRIPTYLINE HYDROCHLORIDE 10 MG/1
TAKE 2 CAPSULES (20 MG) BY ORAL ROUTE 4 TIMES PER DAY CAPSULE ORAL
Qty: 0 | Refills: 0 | Status: ACTIVE | COMMUNITY
Start: 1900-01-01

## 2023-02-27 RX ORDER — HYDROCORTISONE 100 MG/60ML
INSTILL 60 ML RECTALLY IN THE EVENING AS DIRECTED ENEMA RECTAL
Qty: 1260 MILLILITER | Refills: 1 | Status: ACTIVE | COMMUNITY

## 2023-02-27 RX ORDER — CIMETIDINE 200 MG/1
1 TABLET AS NEEDED TABLET, FILM COATED ORAL TWICE A DAY
Qty: 180 TABLET | Refills: 3 | Status: ACTIVE | COMMUNITY
Start: 2022-08-10

## 2023-02-27 RX ORDER — ATENOLOL 25 MG/1
TABLET ORAL
Qty: 0 | Refills: 0 | Status: ACTIVE | COMMUNITY
Start: 1900-01-01

## 2023-02-27 RX ORDER — SUCRALFATE 1 G/1
TAKE 1 TABLET BY ORAL ROUTE 4 TIMES A DAY AS NEEDED FOR 30 DAYS TABLET ORAL
Qty: 120 | Refills: 2 | Status: ACTIVE | COMMUNITY
Start: 2017-06-22

## 2023-02-27 RX ORDER — CIMETIDINE 200 MG/1
1 TABLET AS NEEDED TABLET, FILM COATED ORAL TWICE A DAY
Qty: 180 TABLET | Refills: 3 | Status: ACTIVE | COMMUNITY
Start: 2021-03-30

## 2023-02-27 RX ORDER — FAMOTIDINE 20 MG/1
TAKE 1 TABLET BY MOUTH TWICE DAILY TABLET, FILM COATED ORAL
Qty: 180 TABLET | Refills: 4 | Status: ACTIVE | COMMUNITY

## 2023-02-27 RX ORDER — MESALAMINE 1.2 G/1
TAKE 4 TABLETS BY MOUTH EVERY DAY TABLET, DELAYED RELEASE ORAL
Qty: 360 TABLET | Refills: 3 | Status: ACTIVE | COMMUNITY

## 2023-02-27 RX ORDER — PROGESTERONE 100 MG/1
CAPSULE ORAL
Qty: 0 | Refills: 0 | Status: ACTIVE | COMMUNITY
Start: 1900-01-01

## 2023-02-27 RX ORDER — BUDESONIDE 3 MG/1
3 CAPSULES CAPSULE, DELAYED RELEASE PELLETS ORAL ONCE A DAY
Qty: 180 CAPSULE | Refills: 0 | Status: ACTIVE | COMMUNITY
Start: 2023-02-01

## 2023-02-28 PROBLEM — 64766004 ULCERATIVE COLITIS: Status: ACTIVE | Noted: 2023-02-28

## 2023-03-05 LAB
ADENOVIRUS F 40/41: NOT DETECTED
CAMPYLOBACTER: NOT DETECTED
CLOSTRIDIUM DIFFICILE: NOT DETECTED
CRYPTOSPORIDIUM: NOT DETECTED
ENTAMOEBA HISTOLYTICA: NOT DETECTED
ENTEROAGGREGATIVE E.COLI: NOT DETECTED
ENTEROTOXIGENIC E.COLI: NOT DETECTED
ESCHERICHIA COLI O157: NOT DETECTED
GIARDIA LAMBLIA: NOT DETECTED
NOROVIRUS GI/GII: NOT DETECTED
ROTAVIRUS A: NOT DETECTED
SALMONELLA SPP.: NOT DETECTED
SHIGA-LIKE TOXIN PRODUCING E.COLI: NOT DETECTED
SHIGELLA SPP. / ENTEROINVASIVE E.COLI: NOT DETECTED
VIBRIO PARAHAEMOLYTICUS: NOT DETECTED
VIBRIO SPP.: NOT DETECTED
YERSINIA ENTEROCOLITICA: NOT DETECTED

## 2023-03-21 ENCOUNTER — TELEPHONE ENCOUNTER (OUTPATIENT)
Dept: URBAN - METROPOLITAN AREA CLINIC 128 | Facility: CLINIC | Age: 63
End: 2023-03-21

## 2023-03-21 RX ORDER — PREDNISONE 20 MG/1
2 TABLETS TABLET ORAL ONCE A DAY
Qty: 28 | Refills: 1 | OUTPATIENT
Start: 2023-03-21 | End: 2023-04-18

## 2023-04-17 ENCOUNTER — TELEPHONE ENCOUNTER (OUTPATIENT)
Dept: URBAN - METROPOLITAN AREA CLINIC 23 | Facility: CLINIC | Age: 63
End: 2023-04-17

## 2023-04-17 ENCOUNTER — TELEPHONE ENCOUNTER (OUTPATIENT)
Dept: URBAN - METROPOLITAN AREA CLINIC 79 | Facility: CLINIC | Age: 63
End: 2023-04-17

## 2023-04-17 RX ORDER — VEDOLIZUMAB 300 MG/5ML
AS DIRECTED INJECTION, POWDER, LYOPHILIZED, FOR SOLUTION INTRAVENOUS
Qty: 300 | OUTPATIENT
Start: 2023-04-18 | End: 2023-07-17

## 2023-04-27 ENCOUNTER — OFFICE VISIT (OUTPATIENT)
Dept: URBAN - METROPOLITAN AREA CLINIC 79 | Facility: CLINIC | Age: 63
End: 2023-04-27
Payer: COMMERCIAL

## 2023-04-27 VITALS
HEART RATE: 87 BPM | WEIGHT: 182 LBS | BODY MASS INDEX: 32.25 KG/M2 | TEMPERATURE: 97.7 F | SYSTOLIC BLOOD PRESSURE: 139 MMHG | DIASTOLIC BLOOD PRESSURE: 88 MMHG | RESPIRATION RATE: 20 BRPM | HEIGHT: 63 IN

## 2023-04-27 DIAGNOSIS — K51.80 OTHER ULCERATIVE COLITIS: ICD-10-CM

## 2023-04-27 PROCEDURE — 96413 CHEMO IV INFUSION 1 HR: CPT | Performed by: INTERNAL MEDICINE

## 2023-04-27 RX ORDER — FAMOTIDINE 20 MG/1
TAKE 1 TABLET BY MOUTH TWICE DAILY TABLET, FILM COATED ORAL
Qty: 180 TABLET | Refills: 4 | Status: ACTIVE | COMMUNITY

## 2023-04-27 RX ORDER — BUDESONIDE 3 MG/1
3 CAPSULES CAPSULE, DELAYED RELEASE PELLETS ORAL ONCE A DAY
Qty: 180 CAPSULE | Refills: 0 | Status: ACTIVE | COMMUNITY
Start: 2023-02-01

## 2023-04-27 RX ORDER — ONDANSETRON HYDROCHLORIDE 4 MG/1
TAKE 1 TABLET BY ORAL ROUTE 3 TIMES A DAY AS NEEDED FOR NAUSEA TABLET, FILM COATED ORAL
Qty: 60 | Refills: 2 | Status: ACTIVE | COMMUNITY

## 2023-04-27 RX ORDER — ATENOLOL 25 MG/1
TABLET ORAL
Qty: 0 | Refills: 0 | Status: ACTIVE | COMMUNITY
Start: 1900-01-01

## 2023-04-27 RX ORDER — PROGESTERONE 100 MG/1
CAPSULE ORAL
Qty: 0 | Refills: 0 | Status: ACTIVE | COMMUNITY
Start: 1900-01-01

## 2023-04-27 RX ORDER — CIMETIDINE 200 MG/1
1 TABLET AS NEEDED TABLET, FILM COATED ORAL TWICE A DAY
Qty: 180 TABLET | Refills: 3 | Status: ACTIVE | COMMUNITY
Start: 2022-08-10

## 2023-04-27 RX ORDER — CIMETIDINE 200 MG/1
1 TABLET AS NEEDED TABLET, FILM COATED ORAL TWICE A DAY
Qty: 180 TABLET | Refills: 3 | Status: ACTIVE | COMMUNITY
Start: 2021-03-30

## 2023-04-27 RX ORDER — VEDOLIZUMAB 300 MG/5ML
AS DIRECTED INJECTION, POWDER, LYOPHILIZED, FOR SOLUTION INTRAVENOUS
Qty: 300 | Status: ACTIVE | COMMUNITY
Start: 2023-04-18 | End: 2023-07-17

## 2023-04-27 RX ORDER — MESALAMINE 1.2 G/1
TAKE 4 TABLETS BY MOUTH EVERY DAY TABLET, DELAYED RELEASE ORAL
Qty: 360 TABLET | Refills: 3 | Status: ACTIVE | COMMUNITY

## 2023-04-27 RX ORDER — NORTRIPTYLINE HYDROCHLORIDE 10 MG/1
TAKE 2 CAPSULES (20 MG) BY ORAL ROUTE 4 TIMES PER DAY CAPSULE ORAL
Qty: 0 | Refills: 0 | Status: ACTIVE | COMMUNITY
Start: 1900-01-01

## 2023-04-27 RX ORDER — HYDROCORTISONE 100 MG/60ML
INSTILL 60 ML RECTALLY IN THE EVENING AS DIRECTED ENEMA RECTAL
Qty: 1260 MILLILITER | Refills: 1 | Status: ACTIVE | COMMUNITY

## 2023-04-27 RX ORDER — HYDROCORTISONE ACETATE 25 MG/1
UNWRAP AND _INSERT 1 SUPPOSITORY RECTALLY TWICE A DAY SUPPOSITORY RECTAL
Qty: 8 | Refills: 1 | Status: ACTIVE | COMMUNITY
Start: 2018-05-17

## 2023-04-27 RX ORDER — SUCRALFATE 1 G/1
TAKE 1 TABLET BY ORAL ROUTE 4 TIMES A DAY AS NEEDED FOR 30 DAYS TABLET ORAL
Qty: 120 | Refills: 2 | Status: ACTIVE | COMMUNITY
Start: 2017-06-22

## 2023-06-20 ENCOUNTER — OFFICE VISIT (OUTPATIENT)
Dept: URBAN - METROPOLITAN AREA CLINIC 79 | Facility: CLINIC | Age: 63
End: 2023-06-20
Payer: COMMERCIAL

## 2023-06-20 VITALS
SYSTOLIC BLOOD PRESSURE: 138 MMHG | WEIGHT: 182 LBS | TEMPERATURE: 98.9 F | RESPIRATION RATE: 20 BRPM | HEIGHT: 63 IN | DIASTOLIC BLOOD PRESSURE: 76 MMHG | BODY MASS INDEX: 32.25 KG/M2 | HEART RATE: 88 BPM

## 2023-06-20 DIAGNOSIS — K51.80 CHRONIC PANCOLONIC ULCERATIVE COLITIS: ICD-10-CM

## 2023-06-20 PROCEDURE — 96413 CHEMO IV INFUSION 1 HR: CPT | Performed by: INTERNAL MEDICINE

## 2023-06-20 RX ORDER — MESALAMINE 1.2 G/1
TAKE 4 TABLETS BY MOUTH EVERY DAY TABLET, DELAYED RELEASE ORAL
Qty: 360 TABLET | Refills: 3 | Status: ACTIVE | COMMUNITY

## 2023-06-20 RX ORDER — HYDROCORTISONE ACETATE 25 MG/1
UNWRAP AND _INSERT 1 SUPPOSITORY RECTALLY TWICE A DAY SUPPOSITORY RECTAL
Qty: 8 | Refills: 1 | Status: ACTIVE | COMMUNITY
Start: 2018-05-17

## 2023-06-20 RX ORDER — SUCRALFATE 1 G/1
TAKE 1 TABLET BY ORAL ROUTE 4 TIMES A DAY AS NEEDED FOR 30 DAYS TABLET ORAL
Qty: 120 | Refills: 2 | Status: ACTIVE | COMMUNITY
Start: 2017-06-22

## 2023-06-20 RX ORDER — VEDOLIZUMAB 300 MG/5ML
AS DIRECTED INJECTION, POWDER, LYOPHILIZED, FOR SOLUTION INTRAVENOUS
Qty: 300 | Status: ACTIVE | COMMUNITY
Start: 2023-04-18 | End: 2023-07-17

## 2023-06-20 RX ORDER — NORTRIPTYLINE HYDROCHLORIDE 10 MG/1
TAKE 2 CAPSULES (20 MG) BY ORAL ROUTE 4 TIMES PER DAY CAPSULE ORAL
Qty: 0 | Refills: 0 | Status: ACTIVE | COMMUNITY
Start: 1900-01-01

## 2023-06-20 RX ORDER — CIMETIDINE 200 MG/1
1 TABLET AS NEEDED TABLET, FILM COATED ORAL TWICE A DAY
Qty: 180 TABLET | Refills: 3 | Status: ACTIVE | COMMUNITY
Start: 2021-03-30

## 2023-06-20 RX ORDER — ONDANSETRON HYDROCHLORIDE 4 MG/1
TAKE 1 TABLET BY ORAL ROUTE 3 TIMES A DAY AS NEEDED FOR NAUSEA TABLET, FILM COATED ORAL
Qty: 60 | Refills: 2 | Status: ACTIVE | COMMUNITY

## 2023-06-20 RX ORDER — FAMOTIDINE 20 MG/1
TAKE 1 TABLET BY MOUTH TWICE DAILY TABLET, FILM COATED ORAL
Qty: 180 TABLET | Refills: 4 | Status: ACTIVE | COMMUNITY

## 2023-06-20 RX ORDER — CIMETIDINE 200 MG/1
1 TABLET AS NEEDED TABLET, FILM COATED ORAL TWICE A DAY
Qty: 180 TABLET | Refills: 3 | Status: ACTIVE | COMMUNITY
Start: 2022-08-10

## 2023-06-20 RX ORDER — PROGESTERONE 100 MG/1
CAPSULE ORAL
Qty: 0 | Refills: 0 | Status: ACTIVE | COMMUNITY
Start: 1900-01-01

## 2023-06-20 RX ORDER — BUDESONIDE 3 MG/1
3 CAPSULES CAPSULE, DELAYED RELEASE PELLETS ORAL ONCE A DAY
Qty: 180 CAPSULE | Refills: 0 | Status: ACTIVE | COMMUNITY
Start: 2023-02-01

## 2023-06-20 RX ORDER — HYDROCORTISONE 100 MG/60ML
INSTILL 60 ML RECTALLY IN THE EVENING AS DIRECTED ENEMA RECTAL
Qty: 1260 MILLILITER | Refills: 1 | Status: ACTIVE | COMMUNITY

## 2023-06-20 RX ORDER — ATENOLOL 25 MG/1
TABLET ORAL
Qty: 0 | Refills: 0 | Status: ACTIVE | COMMUNITY
Start: 1900-01-01

## 2023-07-25 ENCOUNTER — TELEPHONE ENCOUNTER (OUTPATIENT)
Dept: URBAN - METROPOLITAN AREA CLINIC 6 | Facility: CLINIC | Age: 63
End: 2023-07-25

## 2023-07-25 RX ORDER — ATENOLOL 25 MG/1
TABLET ORAL
Qty: 0 | Refills: 0 | Status: ACTIVE | COMMUNITY
Start: 1900-01-01

## 2023-07-25 RX ORDER — PROGESTERONE 100 MG/1
CAPSULE ORAL
Qty: 0 | Refills: 0 | Status: ACTIVE | COMMUNITY
Start: 1900-01-01

## 2023-07-25 RX ORDER — NORTRIPTYLINE HYDROCHLORIDE 10 MG/1
TAKE 2 CAPSULES (20 MG) BY ORAL ROUTE 4 TIMES PER DAY CAPSULE ORAL
Qty: 0 | Refills: 0 | Status: ACTIVE | COMMUNITY
Start: 1900-01-01

## 2023-07-25 RX ORDER — CIMETIDINE 200 MG/1
1 TABLET AS NEEDED TABLET, FILM COATED ORAL TWICE A DAY
Qty: 180 TABLET | Refills: 3 | Status: ACTIVE | COMMUNITY
Start: 2021-03-30

## 2023-07-25 RX ORDER — FAMOTIDINE 20 MG/1
TAKE 1 TABLET BY MOUTH TWICE DAILY TABLET, FILM COATED ORAL
Qty: 180 TABLET | Refills: 4 | Status: ACTIVE | COMMUNITY

## 2023-07-25 RX ORDER — ONDANSETRON HYDROCHLORIDE 4 MG/1
TAKE 1 TABLET BY ORAL ROUTE 3 TIMES A DAY AS NEEDED FOR NAUSEA TABLET, FILM COATED ORAL
Qty: 60 | Refills: 2 | Status: ACTIVE | COMMUNITY

## 2023-07-25 RX ORDER — SUCRALFATE 1 G/1
TAKE 1 TABLET BY ORAL ROUTE 4 TIMES A DAY AS NEEDED FOR 30 DAYS TABLET ORAL
Qty: 120 | Refills: 2 | Status: ACTIVE | COMMUNITY
Start: 2017-06-22

## 2023-07-25 RX ORDER — BUDESONIDE 3 MG/1
3 CAPSULES CAPSULE, DELAYED RELEASE PELLETS ORAL ONCE A DAY
Qty: 180 CAPSULE | Refills: 0 | Status: ACTIVE | COMMUNITY
Start: 2023-02-01

## 2023-07-25 RX ORDER — HYDROCORTISONE ACETATE 25 MG/1
UNWRAP AND _INSERT 1 SUPPOSITORY RECTALLY TWICE A DAY SUPPOSITORY RECTAL
Qty: 8 | Refills: 1 | Status: ACTIVE | COMMUNITY
Start: 2018-05-17

## 2023-07-25 RX ORDER — MESALAMINE 1.2 G/1
TAKE 4 TABLETS BY MOUTH EVERY DAY TABLET, DELAYED RELEASE ORAL
Qty: 360 TABLET | Refills: 3 | Status: ACTIVE | COMMUNITY

## 2023-07-25 RX ORDER — HYDROCORTISONE 100 MG/60ML
INSTILL 60 ML RECTALLY IN THE EVENING AS DIRECTED ENEMA RECTAL
Qty: 1260 MILLILITER | Refills: 1 | Status: ACTIVE | COMMUNITY

## 2023-07-25 RX ORDER — PREDNISONE 20 MG/1
2 TABLETS TABLET ORAL ONCE A DAY
Qty: 28 | Refills: 1 | OUTPATIENT
Start: 2023-07-26 | End: 2023-08-23

## 2023-07-25 RX ORDER — CIMETIDINE 200 MG/1
1 TABLET AS NEEDED TABLET, FILM COATED ORAL TWICE A DAY
Qty: 180 TABLET | Refills: 3 | Status: ACTIVE | COMMUNITY
Start: 2022-08-10

## 2023-08-14 ENCOUNTER — OFFICE VISIT (OUTPATIENT)
Dept: URBAN - METROPOLITAN AREA CLINIC 80 | Facility: CLINIC | Age: 63
End: 2023-08-14

## 2023-08-14 ENCOUNTER — OFFICE VISIT (OUTPATIENT)
Dept: URBAN - METROPOLITAN AREA CLINIC 79 | Facility: CLINIC | Age: 63
End: 2023-08-14
Payer: COMMERCIAL

## 2023-08-14 ENCOUNTER — LAB OUTSIDE AN ENCOUNTER (OUTPATIENT)
Dept: URBAN - METROPOLITAN AREA CLINIC 80 | Facility: CLINIC | Age: 63
End: 2023-08-14

## 2023-08-14 ENCOUNTER — TELEPHONE ENCOUNTER (OUTPATIENT)
Dept: URBAN - METROPOLITAN AREA CLINIC 79 | Facility: CLINIC | Age: 63
End: 2023-08-14

## 2023-08-14 VITALS
TEMPERATURE: 98 F | DIASTOLIC BLOOD PRESSURE: 80 MMHG | BODY MASS INDEX: 31.18 KG/M2 | HEART RATE: 62 BPM | WEIGHT: 176 LBS | SYSTOLIC BLOOD PRESSURE: 132 MMHG | RESPIRATION RATE: 18 BRPM | HEIGHT: 63 IN

## 2023-08-14 DIAGNOSIS — K51.80 OTHER ULCERATIVE COLITIS: ICD-10-CM

## 2023-08-14 PROCEDURE — 96413 CHEMO IV INFUSION 1 HR: CPT | Performed by: INTERNAL MEDICINE

## 2023-08-14 RX ORDER — FAMOTIDINE 20 MG/1
TAKE 1 TABLET BY MOUTH TWICE DAILY TABLET, FILM COATED ORAL
Qty: 180 TABLET | Refills: 4 | Status: ACTIVE | COMMUNITY

## 2023-08-14 RX ORDER — PROGESTERONE 100 MG/1
CAPSULE ORAL
Qty: 0 | Refills: 0 | Status: ACTIVE | COMMUNITY
Start: 1900-01-01

## 2023-08-14 RX ORDER — CIMETIDINE 200 MG/1
1 TABLET AS NEEDED TABLET, FILM COATED ORAL TWICE A DAY
Qty: 180 TABLET | Refills: 3 | Status: ACTIVE | COMMUNITY
Start: 2021-03-30

## 2023-08-14 RX ORDER — NORTRIPTYLINE HYDROCHLORIDE 10 MG/1
TAKE 2 CAPSULES (20 MG) BY ORAL ROUTE 4 TIMES PER DAY CAPSULE ORAL
Qty: 0 | Refills: 0 | Status: ACTIVE | COMMUNITY
Start: 1900-01-01

## 2023-08-14 RX ORDER — CIMETIDINE 200 MG/1
1 TABLET AS NEEDED TABLET, FILM COATED ORAL TWICE A DAY
Qty: 180 TABLET | Refills: 3 | Status: ACTIVE | COMMUNITY
Start: 2022-08-10

## 2023-08-14 RX ORDER — HYDROCORTISONE 100 MG/60ML
INSTILL 60 ML RECTALLY IN THE EVENING AS DIRECTED ENEMA RECTAL
Qty: 1260 MILLILITER | Refills: 1 | Status: ACTIVE | COMMUNITY

## 2023-08-14 RX ORDER — HYDROCORTISONE ACETATE 25 MG/1
UNWRAP AND _INSERT 1 SUPPOSITORY RECTALLY TWICE A DAY SUPPOSITORY RECTAL
Qty: 8 | Refills: 1 | Status: ACTIVE | COMMUNITY
Start: 2018-05-17

## 2023-08-14 RX ORDER — MESALAMINE 1.2 G/1
TAKE 4 TABLETS BY MOUTH EVERY DAY TABLET, DELAYED RELEASE ORAL
Qty: 360 TABLET | Refills: 3 | Status: ACTIVE | COMMUNITY

## 2023-08-14 RX ORDER — SUCRALFATE 1 G/1
TAKE 1 TABLET BY ORAL ROUTE 4 TIMES A DAY AS NEEDED FOR 30 DAYS TABLET ORAL
Qty: 120 | Refills: 2 | Status: ACTIVE | COMMUNITY
Start: 2017-06-22

## 2023-08-14 RX ORDER — BUDESONIDE 3 MG/1
3 CAPSULES CAPSULE, DELAYED RELEASE PELLETS ORAL ONCE A DAY
Qty: 180 CAPSULE | Refills: 0 | Status: ACTIVE | COMMUNITY
Start: 2023-02-01

## 2023-08-14 RX ORDER — PREDNISONE 20 MG/1
2 TABLETS TABLET ORAL ONCE A DAY
Qty: 28 | Refills: 1 | Status: ACTIVE | COMMUNITY
Start: 2023-07-26 | End: 2023-08-23

## 2023-08-14 RX ORDER — ONDANSETRON HYDROCHLORIDE 4 MG/1
TAKE 1 TABLET BY ORAL ROUTE 3 TIMES A DAY AS NEEDED FOR NAUSEA TABLET, FILM COATED ORAL
Qty: 60 | Refills: 2 | Status: ACTIVE | COMMUNITY

## 2023-08-14 RX ORDER — ATENOLOL 25 MG/1
TABLET ORAL
Qty: 0 | Refills: 0 | Status: ACTIVE | COMMUNITY
Start: 1900-01-01

## 2023-08-15 LAB
C-REACTIVE PROTEIN, QUANT: 1.4
SED RATE BY MODIFIED: 2

## 2023-08-21 ENCOUNTER — OFFICE VISIT (OUTPATIENT)
Dept: URBAN - METROPOLITAN AREA CLINIC 80 | Facility: CLINIC | Age: 63
End: 2023-08-21
Payer: COMMERCIAL

## 2023-08-21 VITALS — WEIGHT: 183 LBS | HEIGHT: 63 IN | BODY MASS INDEX: 32.43 KG/M2 | TEMPERATURE: 98.1 F

## 2023-08-21 DIAGNOSIS — R19.7 DIARRHEA: ICD-10-CM

## 2023-08-21 DIAGNOSIS — K21.9 GERD: ICD-10-CM

## 2023-08-21 DIAGNOSIS — K51.30 ULCERATIVE PROCTOSIGMOIDITIS: ICD-10-CM

## 2023-08-21 DIAGNOSIS — Z12.11 COLON CANCER SCREENING: ICD-10-CM

## 2023-08-21 PROCEDURE — 99214 OFFICE O/P EST MOD 30 MIN: CPT | Performed by: INTERNAL MEDICINE

## 2023-08-21 RX ORDER — BUDESONIDE 3 MG/1
3 CAPSULES CAPSULE, DELAYED RELEASE PELLETS ORAL ONCE A DAY
Qty: 180 CAPSULE | Refills: 0 | Status: ACTIVE | COMMUNITY
Start: 2023-02-01

## 2023-08-21 RX ORDER — PREDNISONE 20 MG/1
2 TABLETS TABLET ORAL ONCE A DAY
Qty: 28 | Refills: 1 | Status: ACTIVE | COMMUNITY
Start: 2023-07-26 | End: 2023-08-23

## 2023-08-21 RX ORDER — PROGESTERONE 100 MG/1
CAPSULE ORAL
Qty: 0 | Refills: 0 | Status: ACTIVE | COMMUNITY
Start: 1900-01-01

## 2023-08-21 RX ORDER — ONDANSETRON HYDROCHLORIDE 4 MG/1
TAKE 1 TABLET BY ORAL ROUTE 3 TIMES A DAY AS NEEDED FOR NAUSEA TABLET, FILM COATED ORAL
Qty: 60 | Refills: 2 | Status: ACTIVE | COMMUNITY

## 2023-08-21 RX ORDER — SUCRALFATE 1 G/1
TAKE 1 TABLET BY ORAL ROUTE 4 TIMES A DAY AS NEEDED FOR 30 DAYS TABLET ORAL
Qty: 120 | Refills: 2 | Status: ACTIVE | COMMUNITY
Start: 2017-06-22

## 2023-08-21 RX ORDER — ATENOLOL 25 MG/1
TABLET ORAL
Qty: 0 | Refills: 0 | Status: ACTIVE | COMMUNITY
Start: 1900-01-01

## 2023-08-21 RX ORDER — NORTRIPTYLINE HYDROCHLORIDE 10 MG/1
TAKE 2 CAPSULES (20 MG) BY ORAL ROUTE 4 TIMES PER DAY CAPSULE ORAL
Qty: 0 | Refills: 0 | Status: ACTIVE | COMMUNITY
Start: 1900-01-01

## 2023-08-21 RX ORDER — HYDROCORTISONE 100 MG/60ML
INSTILL 60 ML RECTALLY IN THE EVENING AS DIRECTED ENEMA RECTAL
Qty: 1260 MILLILITER | Refills: 1 | Status: ACTIVE | COMMUNITY

## 2023-08-21 RX ORDER — FAMOTIDINE 20 MG/1
TAKE 1 TABLET BY MOUTH TWICE DAILY TABLET, FILM COATED ORAL
Qty: 180 TABLET | Refills: 4 | Status: ACTIVE | COMMUNITY

## 2023-08-21 RX ORDER — MESALAMINE 1.2 G/1
TAKE 4 TABLETS BY MOUTH EVERY DAY TABLET, DELAYED RELEASE ORAL
Qty: 360 TABLET | Refills: 3 | Status: ACTIVE | COMMUNITY

## 2023-08-21 RX ORDER — HYDROCORTISONE ACETATE 25 MG/1
UNWRAP AND _INSERT 1 SUPPOSITORY RECTALLY TWICE A DAY SUPPOSITORY RECTAL
Qty: 8 | Refills: 1 | Status: ACTIVE | COMMUNITY
Start: 2018-05-17

## 2023-08-21 RX ORDER — VEDOLIZUMAB 300 MG/5ML
AS DIRECTED INJECTION, POWDER, LYOPHILIZED, FOR SOLUTION INTRAVENOUS
Qty: 300 | OUTPATIENT
Start: 2023-08-21 | End: 2023-11-19

## 2023-08-21 RX ORDER — SOD SULF/POT CHLORIDE/MAG SULF 1.479 G
12 TABLETS THE FIRST DOSE THE EVENING BEFORE AND SECOND DOSE THE MORNING OF COLONOSCOPY TABLET ORAL TWICE A DAY
Qty: 24 | OUTPATIENT
Start: 2023-08-21 | End: 2023-08-22

## 2023-08-21 RX ORDER — CIMETIDINE 200 MG/1
1 TABLET AS NEEDED TABLET, FILM COATED ORAL TWICE A DAY
Qty: 180 TABLET | Refills: 3 | Status: ACTIVE | COMMUNITY
Start: 2021-03-30

## 2023-08-21 RX ORDER — CIMETIDINE 200 MG/1
1 TABLET AS NEEDED TABLET, FILM COATED ORAL TWICE A DAY
Qty: 180 TABLET | Refills: 3 | Status: ACTIVE | COMMUNITY
Start: 2022-08-10

## 2023-08-21 NOTE — HPI-TODAY'S VISIT:
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 active colitis on colon biopsies.   started on imuran 150mg qday had side effects of nausea, arthritis, and fatigue discussed biologic and had labs in 7/2021 with normal TB and viral hep b/c labs she wanted to hold off biologic trial and has been taking steroid enemas decided to proceed with entyvio in 2/2022 has been on q8 week infusion of entyvio here for follow up A copy of this document will be sent to the referring provider.   she is improved now on entyvio but notes breakthrough symptoms after week 5 notes diarrhea with fatigue and headache when she flares she is asymptomatic after her infusions normal energy last colon in 1/2021 with normal TI and signs of active colitis currently with 1-2 soft bm/day no bleeding or mucus no abd pain normal appetite good energy stable weight  no other complaints.

## 2023-08-22 ENCOUNTER — TELEPHONE ENCOUNTER (OUTPATIENT)
Dept: URBAN - METROPOLITAN AREA CLINIC 79 | Facility: CLINIC | Age: 63
End: 2023-08-22

## 2023-08-25 LAB
A/G RATIO: 2.8
ALBUMIN: 5
ALKALINE PHOSPHATASE: 46
ALT (SGPT): 18
AST (SGOT): 17
BILIRUBIN, TOTAL: 0.5
BUN/CREATININE RATIO: (no result)
BUN: 21
CALCIUM: 9.7
CARBON DIOXIDE, TOTAL: 26
CHLORIDE: 101
CREATININE: 0.77
EGFR: 88
GLOBULIN, TOTAL: 1.8
GLUCOSE: 98
HEMATOCRIT: 43.5
HEMOGLOBIN: 14
MCH: 29.9
MCHC: 32.2
MCV: 92.9
MITOGEN-NIL: >10
MPV: 9.8
PLATELET COUNT: 309
POTASSIUM: 4.6
PROTEIN, TOTAL: 6.8
QUANTIFERON NIL VALUE: 0.06
QUANTIFERON TB1 AG VALUE: 0.02
QUANTIFERON TB2 AG VALUE: 0.02
QUANTIFERON-TB GOLD PLUS: NEGATIVE
RDW: 12.6
RED BLOOD CELL COUNT: 4.68
SODIUM: 137
WHITE BLOOD CELL COUNT: 10.5

## 2023-09-08 ENCOUNTER — WEB ENCOUNTER (OUTPATIENT)
Dept: URBAN - METROPOLITAN AREA CLINIC 80 | Facility: CLINIC | Age: 63
End: 2023-09-08

## 2023-09-08 ENCOUNTER — TELEPHONE ENCOUNTER (OUTPATIENT)
Dept: URBAN - METROPOLITAN AREA CLINIC 80 | Facility: CLINIC | Age: 63
End: 2023-09-08

## 2023-09-08 RX ORDER — ONDANSETRON HYDROCHLORIDE 4 MG/1
TAKE 1 TABLET BY ORAL ROUTE 3 TIMES A DAY AS NEEDED FOR NAUSEA TABLET, FILM COATED ORAL
Qty: 60 | Refills: 2

## 2023-09-11 ENCOUNTER — OFFICE VISIT (OUTPATIENT)
Dept: URBAN - METROPOLITAN AREA CLINIC 79 | Facility: CLINIC | Age: 63
End: 2023-09-11
Payer: COMMERCIAL

## 2023-09-11 VITALS
SYSTOLIC BLOOD PRESSURE: 142 MMHG | TEMPERATURE: 97.5 F | HEART RATE: 69 BPM | DIASTOLIC BLOOD PRESSURE: 88 MMHG | WEIGHT: 182 LBS | HEIGHT: 63 IN | BODY MASS INDEX: 32.25 KG/M2 | RESPIRATION RATE: 18 BRPM

## 2023-09-11 DIAGNOSIS — K51.80 OTHER ULCERATIVE COLITIS: ICD-10-CM

## 2023-09-11 PROCEDURE — 96413 CHEMO IV INFUSION 1 HR: CPT | Performed by: INTERNAL MEDICINE

## 2023-09-11 RX ORDER — BUDESONIDE 3 MG/1
3 CAPSULES CAPSULE, DELAYED RELEASE PELLETS ORAL ONCE A DAY
Qty: 180 CAPSULE | Refills: 0 | Status: ACTIVE | COMMUNITY
Start: 2023-02-01

## 2023-09-11 RX ORDER — ATENOLOL 25 MG/1
TABLET ORAL
Qty: 0 | Refills: 0 | Status: ACTIVE | COMMUNITY
Start: 1900-01-01

## 2023-09-11 RX ORDER — VEDOLIZUMAB 300 MG/5ML
AS DIRECTED INJECTION, POWDER, LYOPHILIZED, FOR SOLUTION INTRAVENOUS
Qty: 300 | Status: ACTIVE | COMMUNITY
Start: 2023-08-21 | End: 2023-11-19

## 2023-09-11 RX ORDER — MESALAMINE 1.2 G/1
TAKE 4 TABLETS BY MOUTH EVERY DAY TABLET, DELAYED RELEASE ORAL
Qty: 360 TABLET | Refills: 3 | Status: ACTIVE | COMMUNITY

## 2023-09-11 RX ORDER — HYDROCORTISONE 100 MG/60ML
INSTILL 60 ML RECTALLY IN THE EVENING AS DIRECTED ENEMA RECTAL
Qty: 1260 MILLILITER | Refills: 1 | Status: ACTIVE | COMMUNITY

## 2023-09-11 RX ORDER — CIMETIDINE 200 MG/1
1 TABLET AS NEEDED TABLET, FILM COATED ORAL TWICE A DAY
Qty: 180 TABLET | Refills: 3 | Status: ACTIVE | COMMUNITY
Start: 2022-08-10

## 2023-09-11 RX ORDER — NORTRIPTYLINE HYDROCHLORIDE 10 MG/1
TAKE 2 CAPSULES (20 MG) BY ORAL ROUTE 4 TIMES PER DAY CAPSULE ORAL
Qty: 0 | Refills: 0 | Status: ACTIVE | COMMUNITY
Start: 1900-01-01

## 2023-09-11 RX ORDER — PROGESTERONE 100 MG/1
CAPSULE ORAL
Qty: 0 | Refills: 0 | Status: ACTIVE | COMMUNITY
Start: 1900-01-01

## 2023-09-11 RX ORDER — SUCRALFATE 1 G/1
TAKE 1 TABLET BY ORAL ROUTE 4 TIMES A DAY AS NEEDED FOR 30 DAYS TABLET ORAL
Qty: 120 | Refills: 2 | Status: ACTIVE | COMMUNITY
Start: 2017-06-22

## 2023-09-11 RX ORDER — HYDROCORTISONE ACETATE 25 MG/1
UNWRAP AND _INSERT 1 SUPPOSITORY RECTALLY TWICE A DAY SUPPOSITORY RECTAL
Qty: 8 | Refills: 1 | Status: ACTIVE | COMMUNITY
Start: 2018-05-17

## 2023-09-11 RX ORDER — ONDANSETRON HYDROCHLORIDE 4 MG/1
TAKE 1 TABLET BY ORAL ROUTE 3 TIMES A DAY AS NEEDED FOR NAUSEA TABLET, FILM COATED ORAL
Qty: 60 | Refills: 2 | Status: ACTIVE | COMMUNITY

## 2023-09-11 RX ORDER — CIMETIDINE 200 MG/1
1 TABLET AS NEEDED TABLET, FILM COATED ORAL TWICE A DAY
Qty: 180 TABLET | Refills: 3 | Status: ACTIVE | COMMUNITY
Start: 2021-03-30

## 2023-09-11 RX ORDER — FAMOTIDINE 20 MG/1
TAKE 1 TABLET BY MOUTH TWICE DAILY TABLET, FILM COATED ORAL
Qty: 180 TABLET | Refills: 4 | Status: ACTIVE | COMMUNITY

## 2023-09-14 ENCOUNTER — OFFICE VISIT (OUTPATIENT)
Dept: URBAN - METROPOLITAN AREA SURGERY CENTER 31 | Facility: SURGERY CENTER | Age: 63
End: 2023-09-14

## 2023-09-20 ENCOUNTER — WEB ENCOUNTER (OUTPATIENT)
Dept: URBAN - METROPOLITAN AREA CLINIC 80 | Facility: CLINIC | Age: 63
End: 2023-09-20

## 2023-09-20 RX ORDER — MESALAMINE 1000 MG/1
1 SUPPOSITORY AT BEDTIME SUPPOSITORY RECTAL ONCE A DAY
Qty: 14 | Refills: 1 | OUTPATIENT
Start: 2023-09-22 | End: 2023-10-20

## 2023-09-25 ENCOUNTER — TELEPHONE ENCOUNTER (OUTPATIENT)
Dept: URBAN - METROPOLITAN AREA CLINIC 80 | Facility: CLINIC | Age: 63
End: 2023-09-25

## 2023-10-06 ENCOUNTER — OFFICE VISIT (OUTPATIENT)
Dept: URBAN - METROPOLITAN AREA SURGERY CENTER 31 | Facility: SURGERY CENTER | Age: 63
End: 2023-10-06

## 2023-10-09 ENCOUNTER — OFFICE VISIT (OUTPATIENT)
Dept: URBAN - METROPOLITAN AREA CLINIC 79 | Facility: CLINIC | Age: 63
End: 2023-10-09

## 2023-10-19 ENCOUNTER — TELEPHONE ENCOUNTER (OUTPATIENT)
Dept: URBAN - METROPOLITAN AREA CLINIC 19 | Facility: CLINIC | Age: 63
End: 2023-10-19

## 2023-10-20 ENCOUNTER — TELEPHONE ENCOUNTER (OUTPATIENT)
Dept: URBAN - METROPOLITAN AREA CLINIC 80 | Facility: CLINIC | Age: 63
End: 2023-10-20

## 2023-10-23 LAB
ADENOVIRUS F 40/41: NOT DETECTED
CAMPYLOBACTER: NOT DETECTED
CLOSTRIDIUM DIFFICILE: NOT DETECTED
ENTAMOEBA HISTOLYTICA: NOT DETECTED
ENTEROAGGREGATIVE E.COLI: NOT DETECTED
ENTEROTOXIGENIC E.COLI: NOT DETECTED
ESCHERICHIA COLI O157: NOT DETECTED
GIARDIA LAMBLIA: NOT DETECTED
NOROVIRUS GI/GII: NOT DETECTED
ROTAVIRUS A: NOT DETECTED
SALMONELLA SPP.: NOT DETECTED
SHIGA-LIKE TOXIN PRODUCING E.COLI: NOT DETECTED
SHIGELLA SPP. / ENTEROINVASIVE E.COLI: NOT DETECTED
VIBRIO PARAHAEMOLYTICUS: NOT DETECTED
VIBRIO SPP.: NOT DETECTED
YERSINIA ENTEROCOLITICA: NOT DETECTED

## 2023-10-27 ENCOUNTER — OUT OF OFFICE VISIT (OUTPATIENT)
Dept: URBAN - METROPOLITAN AREA SURGERY CENTER 31 | Facility: SURGERY CENTER | Age: 63
End: 2023-10-27
Payer: COMMERCIAL

## 2023-10-27 DIAGNOSIS — R19.7 CHRONIC DIARRHEA: ICD-10-CM

## 2023-10-27 DIAGNOSIS — K51.80 CHRONIC PANCOLONIC ULCERATIVE COLITIS: ICD-10-CM

## 2023-10-27 DIAGNOSIS — Z12.11 COLON CANCER SCREENING (HIGH RISK): ICD-10-CM

## 2023-10-27 DIAGNOSIS — K51.90 ULCERATIVE COLITIS: ICD-10-CM

## 2023-10-27 DIAGNOSIS — K57.30 DIVERTICULA, COLON: ICD-10-CM

## 2023-10-27 PROCEDURE — 00811 ANES LWR INTST NDSC NOS: CPT | Performed by: NURSE ANESTHETIST, CERTIFIED REGISTERED

## 2023-10-27 PROCEDURE — G8907 PT DOC NO EVENTS ON DISCHARG: HCPCS | Performed by: INTERNAL MEDICINE

## 2023-10-27 PROCEDURE — 45380 COLONOSCOPY AND BIOPSY: CPT | Performed by: INTERNAL MEDICINE

## 2023-10-30 ENCOUNTER — WEB ENCOUNTER (OUTPATIENT)
Dept: URBAN - METROPOLITAN AREA CLINIC 80 | Facility: CLINIC | Age: 63
End: 2023-10-30

## 2023-10-30 RX ORDER — PREDNISONE 20 MG/1
2 TABLETS TABLET ORAL ONCE A DAY
Qty: 28 | Refills: 1 | OUTPATIENT
Start: 2023-10-31 | End: 2023-11-27

## 2023-10-31 ENCOUNTER — TELEPHONE ENCOUNTER (OUTPATIENT)
Dept: URBAN - METROPOLITAN AREA CLINIC 19 | Facility: CLINIC | Age: 63
End: 2023-10-31

## 2023-10-31 RX ORDER — PREDNISONE 20 MG/1
2 TABLETS TABLET ORAL ONCE A DAY
Qty: 28 | Refills: 1
Start: 2023-10-31 | End: 2023-11-28

## 2023-11-01 ENCOUNTER — WEB ENCOUNTER (OUTPATIENT)
Dept: URBAN - METROPOLITAN AREA CLINIC 80 | Facility: CLINIC | Age: 63
End: 2023-11-01

## 2023-11-02 ENCOUNTER — TELEPHONE ENCOUNTER (OUTPATIENT)
Dept: URBAN - METROPOLITAN AREA CLINIC 79 | Facility: CLINIC | Age: 63
End: 2023-11-02

## 2023-11-06 ENCOUNTER — OFFICE VISIT (OUTPATIENT)
Dept: URBAN - METROPOLITAN AREA CLINIC 79 | Facility: CLINIC | Age: 63
End: 2023-11-06

## 2023-11-07 ENCOUNTER — TELEPHONE ENCOUNTER (OUTPATIENT)
Dept: URBAN - METROPOLITAN AREA CLINIC 23 | Facility: CLINIC | Age: 63
End: 2023-11-07

## 2023-11-10 ENCOUNTER — OFFICE VISIT (OUTPATIENT)
Dept: URBAN - METROPOLITAN AREA CLINIC 79 | Facility: CLINIC | Age: 63
End: 2023-11-10

## 2023-11-13 ENCOUNTER — TELEPHONE ENCOUNTER (OUTPATIENT)
Dept: URBAN - METROPOLITAN AREA CLINIC 105 | Facility: CLINIC | Age: 63
End: 2023-11-13

## 2023-11-13 RX ORDER — INFLIXIMAB 100 MG/10ML
AS DIRECTED INJECTION, POWDER, LYOPHILIZED, FOR SOLUTION INTRAVENOUS
OUTPATIENT
Start: 2023-11-13

## 2023-11-13 RX ORDER — PREDNISONE 20 MG/1
2 TABLETS TABLET ORAL ONCE A DAY
Qty: 28 | Refills: 1
Start: 2023-10-31 | End: 2023-12-11

## 2023-11-13 RX ORDER — HYDROCORTISONE SODIUM SUCCINATE 100 MG/2ML
AS DIRECTED INJECTION, POWDER, FOR SOLUTION INTRAMUSCULAR; INTRAVENOUS
Qty: 100 | Refills: 0 | OUTPATIENT
Start: 2023-11-13 | End: 2023-11-14

## 2023-11-13 RX ORDER — ACETAMINOPHEN 650 MG
2 TABLETS AS NEEDED TABLET, EXTENDED RELEASE ORAL
Qty: 6 | Refills: 0 | OUTPATIENT
Start: 2023-11-13 | End: 2023-11-14

## 2023-11-20 ENCOUNTER — OFFICE VISIT (OUTPATIENT)
Dept: URBAN - METROPOLITAN AREA CLINIC 79 | Facility: CLINIC | Age: 63
End: 2023-11-20
Payer: COMMERCIAL

## 2023-11-20 ENCOUNTER — TELEPHONE ENCOUNTER (OUTPATIENT)
Dept: URBAN - METROPOLITAN AREA CLINIC 18 | Facility: CLINIC | Age: 63
End: 2023-11-20

## 2023-11-20 ENCOUNTER — OFFICE VISIT (OUTPATIENT)
Dept: URBAN - METROPOLITAN AREA CLINIC 18 | Facility: CLINIC | Age: 63
End: 2023-11-20

## 2023-11-20 VITALS
SYSTOLIC BLOOD PRESSURE: 140 MMHG | HEIGHT: 63 IN | TEMPERATURE: 98 F | RESPIRATION RATE: 18 BRPM | HEART RATE: 61 BPM | BODY MASS INDEX: 31.89 KG/M2 | WEIGHT: 180 LBS | DIASTOLIC BLOOD PRESSURE: 78 MMHG

## 2023-11-20 DIAGNOSIS — K51.80 OTHER ULCERATIVE COLITIS: ICD-10-CM

## 2023-11-20 PROCEDURE — 96413 CHEMO IV INFUSION 1 HR: CPT | Performed by: INTERNAL MEDICINE

## 2023-11-20 PROCEDURE — 96415 CHEMO IV INFUSION ADDL HR: CPT | Performed by: INTERNAL MEDICINE

## 2023-11-20 RX ORDER — CIMETIDINE 200 MG/1
1 TABLET AS NEEDED TABLET, FILM COATED ORAL TWICE A DAY
Qty: 180 TABLET | Refills: 3 | Status: ACTIVE | COMMUNITY
Start: 2022-08-10

## 2023-11-20 RX ORDER — ATENOLOL 25 MG/1
TABLET ORAL
Qty: 0 | Refills: 0 | Status: ACTIVE | COMMUNITY
Start: 1900-01-01

## 2023-11-20 RX ORDER — CIMETIDINE 200 MG/1
1 TABLET AS NEEDED TABLET, FILM COATED ORAL TWICE A DAY
Qty: 180 TABLET | Refills: 3 | Status: ACTIVE | COMMUNITY
Start: 2021-03-30

## 2023-11-20 RX ORDER — INFLIXIMAB 100 MG/10ML
AS DIRECTED INJECTION, POWDER, LYOPHILIZED, FOR SOLUTION INTRAVENOUS
Status: ACTIVE | COMMUNITY
Start: 2023-11-13

## 2023-11-20 RX ORDER — HYDROCORTISONE ACETATE 25 MG/1
UNWRAP AND _INSERT 1 SUPPOSITORY RECTALLY TWICE A DAY SUPPOSITORY RECTAL
Qty: 8 | Refills: 1 | Status: ACTIVE | COMMUNITY
Start: 2018-05-17

## 2023-11-20 RX ORDER — HYDROCORTISONE 100 MG/60ML
INSTILL 60 ML RECTALLY IN THE EVENING AS DIRECTED ENEMA RECTAL
Qty: 1260 MILLILITER | Refills: 1 | Status: ACTIVE | COMMUNITY

## 2023-11-20 RX ORDER — PREDNISONE 20 MG/1
2 TABLETS TABLET ORAL ONCE A DAY
Qty: 28 | Refills: 1 | Status: ACTIVE | COMMUNITY
Start: 2023-10-31 | End: 2023-12-11

## 2023-11-20 RX ORDER — ONDANSETRON HYDROCHLORIDE 4 MG/1
TAKE 1 TABLET BY ORAL ROUTE 3 TIMES A DAY AS NEEDED FOR NAUSEA TABLET, FILM COATED ORAL
Qty: 60 | Refills: 2 | Status: ACTIVE | COMMUNITY

## 2023-11-20 RX ORDER — PREDNISONE 20 MG/1
TAPER REGIMEN AS DIRECTED TABLET ORAL ONCE A DAY
Qty: 35 TABLET | Refills: 0 | OUTPATIENT
Start: 2023-11-28 | End: 2023-12-28

## 2023-11-20 RX ORDER — PROGESTERONE 100 MG/1
CAPSULE ORAL
Qty: 0 | Refills: 0 | Status: ACTIVE | COMMUNITY
Start: 1900-01-01

## 2023-11-20 RX ORDER — MESALAMINE 1.2 G/1
TAKE 4 TABLETS BY MOUTH EVERY DAY TABLET, DELAYED RELEASE ORAL
Qty: 360 TABLET | Refills: 3 | Status: ACTIVE | COMMUNITY

## 2023-11-20 RX ORDER — NORTRIPTYLINE HYDROCHLORIDE 10 MG/1
TAKE 2 CAPSULES (20 MG) BY ORAL ROUTE 4 TIMES PER DAY CAPSULE ORAL
Qty: 0 | Refills: 0 | Status: ACTIVE | COMMUNITY
Start: 1900-01-01

## 2023-11-20 RX ORDER — FAMOTIDINE 20 MG/1
TAKE 1 TABLET BY MOUTH TWICE DAILY TABLET, FILM COATED ORAL
Qty: 180 TABLET | Refills: 4 | Status: ACTIVE | COMMUNITY

## 2023-11-20 RX ORDER — SUCRALFATE 1 G/1
TAKE 1 TABLET BY ORAL ROUTE 4 TIMES A DAY AS NEEDED FOR 30 DAYS TABLET ORAL
Qty: 120 | Refills: 2 | Status: ACTIVE | COMMUNITY
Start: 2017-06-22

## 2023-11-20 RX ORDER — BUDESONIDE 3 MG/1
3 CAPSULES CAPSULE, DELAYED RELEASE PELLETS ORAL ONCE A DAY
Qty: 180 CAPSULE | Refills: 0 | Status: ACTIVE | COMMUNITY
Start: 2023-02-01

## 2023-12-11 ENCOUNTER — TELEPHONE ENCOUNTER (OUTPATIENT)
Dept: URBAN - METROPOLITAN AREA CLINIC 18 | Facility: CLINIC | Age: 63
End: 2023-12-11

## 2023-12-11 ENCOUNTER — OFFICE VISIT (OUTPATIENT)
Dept: URBAN - METROPOLITAN AREA CLINIC 18 | Facility: CLINIC | Age: 63
End: 2023-12-11
Payer: COMMERCIAL

## 2023-12-11 VITALS
BODY MASS INDEX: 31.71 KG/M2 | HEIGHT: 63 IN | TEMPERATURE: 97.9 F | RESPIRATION RATE: 18 BRPM | WEIGHT: 179 LBS | DIASTOLIC BLOOD PRESSURE: 88 MMHG | HEART RATE: 60 BPM | SYSTOLIC BLOOD PRESSURE: 132 MMHG

## 2023-12-11 DIAGNOSIS — K51.80 OTHER ULCERATIVE COLITIS: ICD-10-CM

## 2023-12-11 PROCEDURE — 96375 TX/PRO/DX INJ NEW DRUG ADDON: CPT | Performed by: INTERNAL MEDICINE

## 2023-12-11 PROCEDURE — 96415 CHEMO IV INFUSION ADDL HR: CPT | Performed by: INTERNAL MEDICINE

## 2023-12-11 PROCEDURE — 96413 CHEMO IV INFUSION 1 HR: CPT | Performed by: INTERNAL MEDICINE

## 2023-12-11 RX ORDER — CIMETIDINE 200 MG/1
1 TABLET AS NEEDED TABLET, FILM COATED ORAL TWICE A DAY
Qty: 180 TABLET | Refills: 3 | Status: ACTIVE | COMMUNITY
Start: 2021-03-30

## 2023-12-11 RX ORDER — PREDNISONE 20 MG/1
2 TABLETS TABLET ORAL ONCE A DAY
Qty: 28 | Refills: 1 | Status: ACTIVE | COMMUNITY
Start: 2023-10-31 | End: 2023-12-11

## 2023-12-11 RX ORDER — INFLIXIMAB 100 MG/10ML
AS DIRECTED INJECTION, POWDER, LYOPHILIZED, FOR SOLUTION INTRAVENOUS
Status: ACTIVE | COMMUNITY
Start: 2023-11-13

## 2023-12-11 RX ORDER — FAMOTIDINE 20 MG/1
TAKE 1 TABLET BY MOUTH TWICE DAILY TABLET, FILM COATED ORAL
Qty: 180 TABLET | Refills: 4 | Status: ACTIVE | COMMUNITY

## 2023-12-11 RX ORDER — BUDESONIDE 3 MG/1
3 CAPSULES CAPSULE, DELAYED RELEASE PELLETS ORAL ONCE A DAY
Qty: 180 CAPSULE | Refills: 0 | Status: ACTIVE | COMMUNITY
Start: 2023-02-01

## 2023-12-11 RX ORDER — PROGESTERONE 100 MG/1
CAPSULE ORAL
Qty: 0 | Refills: 0 | Status: ACTIVE | COMMUNITY
Start: 1900-01-01

## 2023-12-11 RX ORDER — PREDNISONE 20 MG/1
TAPER REGIMEN AS DIRECTED TABLET ORAL ONCE A DAY
Qty: 35 TABLET | Refills: 0 | Status: ACTIVE | COMMUNITY
Start: 2023-11-28 | End: 2023-12-28

## 2023-12-11 RX ORDER — MESALAMINE 1.2 G/1
TAKE 4 TABLETS BY MOUTH EVERY DAY TABLET, DELAYED RELEASE ORAL
Qty: 360 TABLET | Refills: 3 | Status: ACTIVE | COMMUNITY

## 2023-12-11 RX ORDER — ATENOLOL 25 MG/1
TABLET ORAL
Qty: 0 | Refills: 0 | Status: ACTIVE | COMMUNITY
Start: 1900-01-01

## 2023-12-11 RX ORDER — SUCRALFATE 1 G/1
TAKE 1 TABLET BY ORAL ROUTE 4 TIMES A DAY AS NEEDED FOR 30 DAYS TABLET ORAL
Qty: 120 | Refills: 2 | Status: ACTIVE | COMMUNITY
Start: 2017-06-22

## 2023-12-11 RX ORDER — HYDROCORTISONE ACETATE 25 MG/1
UNWRAP AND _INSERT 1 SUPPOSITORY RECTALLY TWICE A DAY SUPPOSITORY RECTAL
Qty: 8 | Refills: 1 | Status: ACTIVE | COMMUNITY
Start: 2018-05-17

## 2023-12-11 RX ORDER — ONDANSETRON HYDROCHLORIDE 4 MG/1
TAKE 1 TABLET BY ORAL ROUTE 3 TIMES A DAY AS NEEDED FOR NAUSEA TABLET, FILM COATED ORAL
Qty: 60 | Refills: 2 | Status: ACTIVE | COMMUNITY

## 2023-12-11 RX ORDER — CIMETIDINE 200 MG/1
1 TABLET AS NEEDED TABLET, FILM COATED ORAL TWICE A DAY
Qty: 180 TABLET | Refills: 3 | Status: ACTIVE | COMMUNITY
Start: 2022-08-10

## 2023-12-11 RX ORDER — NORTRIPTYLINE HYDROCHLORIDE 10 MG/1
TAKE 2 CAPSULES (20 MG) BY ORAL ROUTE 4 TIMES PER DAY CAPSULE ORAL
Qty: 0 | Refills: 0 | Status: ACTIVE | COMMUNITY
Start: 1900-01-01

## 2023-12-11 RX ORDER — HYDROCORTISONE 100 MG/60ML
INSTILL 60 ML RECTALLY IN THE EVENING AS DIRECTED ENEMA RECTAL
Qty: 1260 MILLILITER | Refills: 1 | Status: ACTIVE | COMMUNITY

## 2023-12-11 RX ORDER — ONDANSETRON 8 MG/1
1 TABLET ON THE TONGUE AND ALLOW TO DISSOLVE PRN NAUSEA TABLET, ORALLY DISINTEGRATING ORAL
Qty: 60 | Refills: 3 | OUTPATIENT
Start: 2023-12-12

## 2024-01-05 ENCOUNTER — OFFICE VISIT (OUTPATIENT)
Dept: URBAN - METROPOLITAN AREA CLINIC 79 | Facility: CLINIC | Age: 64
End: 2024-01-05

## 2024-01-23 ENCOUNTER — TELEPHONE ENCOUNTER (OUTPATIENT)
Dept: URBAN - METROPOLITAN AREA CLINIC 6 | Facility: CLINIC | Age: 64
End: 2024-01-23

## 2024-01-23 RX ORDER — MESALAMINE 1.2 G/1
TAKE 4 TABLETS BY MOUTH EVERY DAY TABLET, DELAYED RELEASE ORAL
Qty: 360 TABLET | Refills: 3

## 2024-02-05 ENCOUNTER — OV EP (OUTPATIENT)
Dept: URBAN - METROPOLITAN AREA CLINIC 80 | Facility: CLINIC | Age: 64
End: 2024-02-05
Payer: COMMERCIAL

## 2024-02-05 VITALS
SYSTOLIC BLOOD PRESSURE: 160 MMHG | TEMPERATURE: 97 F | DIASTOLIC BLOOD PRESSURE: 84 MMHG | HEIGHT: 63 IN | WEIGHT: 171.4 LBS | BODY MASS INDEX: 30.37 KG/M2 | HEART RATE: 64 BPM

## 2024-02-05 DIAGNOSIS — Z12.11 COLON CANCER SCREENING: ICD-10-CM

## 2024-02-05 DIAGNOSIS — R19.7 DIARRHEA: ICD-10-CM

## 2024-02-05 DIAGNOSIS — K51.30 ULCERATIVE PROCTOSIGMOIDITIS: ICD-10-CM

## 2024-02-05 DIAGNOSIS — K21.9 GERD: ICD-10-CM

## 2024-02-05 PROCEDURE — 99214 OFFICE O/P EST MOD 30 MIN: CPT | Performed by: INTERNAL MEDICINE

## 2024-02-05 RX ORDER — PROGESTERONE 100 MG/1
CAPSULE ORAL
Qty: 0 | Refills: 0 | Status: ACTIVE | COMMUNITY
Start: 1900-01-01

## 2024-02-05 RX ORDER — MESALAMINE 1.2 G/1
TAKE 4 TABLETS BY MOUTH EVERY DAY TABLET, DELAYED RELEASE ORAL
Qty: 360 TABLET | Refills: 3 | Status: ACTIVE | COMMUNITY

## 2024-02-05 RX ORDER — ATENOLOL 25 MG/1
TABLET ORAL
Qty: 0 | Refills: 0 | Status: ACTIVE | COMMUNITY
Start: 1900-01-01

## 2024-02-05 RX ORDER — BUDESONIDE 3 MG/1
3 CAPSULES CAPSULE, DELAYED RELEASE PELLETS ORAL ONCE A DAY
Qty: 180 CAPSULE | Refills: 0 | Status: ACTIVE | COMMUNITY
Start: 2023-02-01

## 2024-02-05 RX ORDER — HYDROCORTISONE 100 MG/60ML
INSTILL 60 ML RECTALLY IN THE EVENING AS DIRECTED ENEMA RECTAL
Qty: 1260 MILLILITER | Refills: 1 | Status: ACTIVE | COMMUNITY

## 2024-02-05 RX ORDER — ONDANSETRON HYDROCHLORIDE 4 MG/1
TAKE 1 TABLET BY ORAL ROUTE 3 TIMES A DAY AS NEEDED FOR NAUSEA TABLET, FILM COATED ORAL
Qty: 60 | Refills: 2 | Status: ACTIVE | COMMUNITY

## 2024-02-05 RX ORDER — ONDANSETRON 8 MG/1
1 TABLET ON THE TONGUE AND ALLOW TO DISSOLVE PRN NAUSEA TABLET, ORALLY DISINTEGRATING ORAL
Qty: 60 | Refills: 3 | Status: ACTIVE | COMMUNITY
Start: 2023-12-12

## 2024-02-05 RX ORDER — NORTRIPTYLINE HYDROCHLORIDE 10 MG/1
TAKE 2 CAPSULES (20 MG) BY ORAL ROUTE 4 TIMES PER DAY CAPSULE ORAL
Qty: 0 | Refills: 0 | Status: ACTIVE | COMMUNITY
Start: 1900-01-01

## 2024-02-05 RX ORDER — CIMETIDINE 200 MG/1
1 TABLET AS NEEDED TABLET, FILM COATED ORAL TWICE A DAY
Qty: 180 TABLET | Refills: 3 | Status: ACTIVE | COMMUNITY
Start: 2021-03-30

## 2024-02-05 RX ORDER — CIMETIDINE 200 MG/1
1 TABLET AS NEEDED TABLET, FILM COATED ORAL TWICE A DAY
Qty: 180 TABLET | Refills: 3 | Status: ACTIVE | COMMUNITY
Start: 2022-08-10

## 2024-02-05 RX ORDER — INFLIXIMAB 100 MG/10ML
AS DIRECTED INJECTION, POWDER, LYOPHILIZED, FOR SOLUTION INTRAVENOUS
Status: ACTIVE | COMMUNITY
Start: 2023-11-13

## 2024-02-05 RX ORDER — FAMOTIDINE 20 MG/1
TAKE 1 TABLET BY MOUTH TWICE DAILY TABLET, FILM COATED ORAL
Qty: 180 TABLET | Refills: 4 | Status: ACTIVE | COMMUNITY

## 2024-02-05 RX ORDER — HYDROCORTISONE ACETATE 25 MG/1
UNWRAP AND _INSERT 1 SUPPOSITORY RECTALLY TWICE A DAY SUPPOSITORY RECTAL
Qty: 8 | Refills: 1 | Status: ACTIVE | COMMUNITY
Start: 2018-05-17

## 2024-02-05 RX ORDER — SUCRALFATE 1 G/1
TAKE 1 TABLET BY ORAL ROUTE 4 TIMES A DAY AS NEEDED FOR 30 DAYS TABLET ORAL
Qty: 120 | Refills: 2 | Status: ACTIVE | COMMUNITY
Start: 2017-06-22

## 2024-02-05 NOTE — HPI-TODAY'S VISIT:
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 active colitis on colon biopsies.   started on imuran 150mg qday had side effects of nausea, arthritis, and fatigue discussed biologic and had labs in 7/2021 with normal TB and viral hep b/c labs she wanted to hold off biologic trial and has been taking steroid enemas decided to proceed with entyvio in 2/2022 has been on q8 week infusion of entyvio with ongoing symptoms CTE in 9/2023 with normal small bowel.  DC and sigmoid colitis noted Colonoscopy in 10/2023 with normal ti/right colon.  noted active proctosigmoiditis on exam.  no cmv on exam switched to remicade in 11/2023 here for follow up A copy of this document will be sent to the referring provider.   pt started induction of remicade and had the 0 and 2 week dose noted worsening symptoms of nausea she decided to cancel the 3rd dose of her induction she comes in noting improved in her abd pain and nausea with complete resolution her stools have improved with no further diarrhea or hematochezia no urgency normal energy stable weight  no other complaints.

## 2024-03-01 ENCOUNTER — REM (OUTPATIENT)
Dept: URBAN - METROPOLITAN AREA CLINIC 79 | Facility: CLINIC | Age: 64
End: 2024-03-01

## 2024-04-26 ENCOUNTER — REM (OUTPATIENT)
Dept: URBAN - METROPOLITAN AREA CLINIC 79 | Facility: CLINIC | Age: 64
End: 2024-04-26

## 2024-06-13 ENCOUNTER — TELEPHONE ENCOUNTER (OUTPATIENT)
Dept: URBAN - METROPOLITAN AREA CLINIC 6 | Facility: CLINIC | Age: 64
End: 2024-06-13

## 2024-06-13 RX ORDER — PREDNISONE 20 MG/1
2 TABLETS TABLET ORAL ONCE A DAY
Qty: 28 | Refills: 1 | OUTPATIENT
Start: 2024-06-13 | End: 2024-07-11

## 2024-08-05 ENCOUNTER — OFFICE VISIT (OUTPATIENT)
Dept: URBAN - METROPOLITAN AREA CLINIC 80 | Facility: CLINIC | Age: 64
End: 2024-08-05
Payer: COMMERCIAL

## 2024-08-05 ENCOUNTER — DASHBOARD ENCOUNTERS (OUTPATIENT)
Age: 64
End: 2024-08-05

## 2024-08-05 VITALS
HEIGHT: 63 IN | WEIGHT: 165.4 LBS | DIASTOLIC BLOOD PRESSURE: 69 MMHG | SYSTOLIC BLOOD PRESSURE: 125 MMHG | HEART RATE: 66 BPM | BODY MASS INDEX: 29.3 KG/M2 | TEMPERATURE: 97.8 F

## 2024-08-05 DIAGNOSIS — K21.9 GERD: ICD-10-CM

## 2024-08-05 DIAGNOSIS — R19.7 DIARRHEA: ICD-10-CM

## 2024-08-05 DIAGNOSIS — K51.30 ULCERATIVE PROCTOSIGMOIDITIS: ICD-10-CM

## 2024-08-05 DIAGNOSIS — Z12.11 COLON CANCER SCREENING: ICD-10-CM

## 2024-08-05 PROCEDURE — 99213 OFFICE O/P EST LOW 20 MIN: CPT | Performed by: INTERNAL MEDICINE

## 2024-08-05 RX ORDER — HYDROCORTISONE 100 MG/60ML
INSTILL 60 ML RECTALLY IN THE EVENING AS DIRECTED ENEMA RECTAL
Qty: 1260 MILLILITER | Refills: 1 | Status: ACTIVE | COMMUNITY

## 2024-08-05 RX ORDER — HYDROCORTISONE ACETATE 25 MG/1
UNWRAP AND _INSERT 1 SUPPOSITORY RECTALLY TWICE A DAY SUPPOSITORY RECTAL
Qty: 8 | Refills: 1 | Status: ACTIVE | COMMUNITY
Start: 2018-05-17

## 2024-08-05 RX ORDER — SUCRALFATE 1 G/1
TAKE 1 TABLET BY ORAL ROUTE 4 TIMES A DAY AS NEEDED FOR 30 DAYS TABLET ORAL
Qty: 120 | Refills: 2 | Status: ACTIVE | COMMUNITY
Start: 2017-06-22

## 2024-08-05 RX ORDER — BUDESONIDE 3 MG/1
3 CAPSULES CAPSULE, DELAYED RELEASE PELLETS ORAL ONCE A DAY
Qty: 180 CAPSULE | Refills: 0 | Status: ACTIVE | COMMUNITY
Start: 2023-02-01

## 2024-08-05 RX ORDER — PROGESTERONE 100 MG/1
CAPSULE ORAL
Qty: 0 | Refills: 0 | Status: ACTIVE | COMMUNITY
Start: 1900-01-01

## 2024-08-05 RX ORDER — INFLIXIMAB 100 MG/10ML
AS DIRECTED INJECTION, POWDER, LYOPHILIZED, FOR SOLUTION INTRAVENOUS
Status: ACTIVE | COMMUNITY
Start: 2023-11-13

## 2024-08-05 RX ORDER — NORTRIPTYLINE HYDROCHLORIDE 10 MG/1
TAKE 2 CAPSULES (20 MG) BY ORAL ROUTE 4 TIMES PER DAY CAPSULE ORAL
Qty: 0 | Refills: 0 | Status: ACTIVE | COMMUNITY
Start: 1900-01-01

## 2024-08-05 RX ORDER — ONDANSETRON HYDROCHLORIDE 4 MG/1
TAKE 1 TABLET BY ORAL ROUTE 3 TIMES A DAY AS NEEDED FOR NAUSEA TABLET, FILM COATED ORAL
Qty: 60 | Refills: 2 | Status: ACTIVE | COMMUNITY

## 2024-08-05 RX ORDER — MESALAMINE 1.2 G/1
TAKE 4 TABLETS BY MOUTH EVERY DAY TABLET, DELAYED RELEASE ORAL
Qty: 360 TABLET | Refills: 3 | Status: ACTIVE | COMMUNITY

## 2024-08-05 RX ORDER — FAMOTIDINE 20 MG/1
TAKE 1 TABLET BY MOUTH TWICE DAILY TABLET, FILM COATED ORAL
Qty: 180 TABLET | Refills: 4 | Status: ACTIVE | COMMUNITY

## 2024-08-05 RX ORDER — ATENOLOL 25 MG/1
TABLET ORAL
Qty: 0 | Refills: 0 | Status: ACTIVE | COMMUNITY
Start: 1900-01-01

## 2024-08-05 RX ORDER — ONDANSETRON 8 MG/1
1 TABLET ON THE TONGUE AND ALLOW TO DISSOLVE PRN NAUSEA TABLET, ORALLY DISINTEGRATING ORAL
Qty: 60 | Refills: 3 | Status: ACTIVE | COMMUNITY
Start: 2023-12-12

## 2024-08-05 RX ORDER — CIMETIDINE 200 MG/1
1 TABLET AS NEEDED TABLET, FILM COATED ORAL TWICE A DAY
Qty: 180 TABLET | Refills: 3 | Status: ACTIVE | COMMUNITY
Start: 2022-08-10

## 2024-08-05 RX ORDER — CIMETIDINE 200 MG/1
1 TABLET AS NEEDED TABLET, FILM COATED ORAL TWICE A DAY
Qty: 180 TABLET | Refills: 3 | Status: ACTIVE | COMMUNITY
Start: 2021-03-30

## 2024-08-05 NOTE — HPI-TODAY'S VISIT:
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 active colitis on colon biopsies.   started on imuran 150mg qday had side effects of nausea, arthritis, and fatigue discussed biologic and had labs in 7/2021 with normal TB and viral hep b/c labs she wanted to hold off biologic trial and has been taking steroid enemas decided to proceed with entyvio in 2/2022 has been on q8 week infusion of entyvio with ongoing symptoms CTE in 9/2023 with normal small bowel.  DC and sigmoid colitis noted Colonoscopy in 10/2023 with normal ti/right colon.  noted active proctosigmoiditis on exam.  no cmv on exam switched to remicade in 11/2023 and had 2 loading doses on week 0 and 2 but stopped it due to arthritis symptoms last visit, decided to stay just on lialda here for follow up A copy of this document will be sent to the referring provider.   has been well in the last 6 months had needed 1 course of prednisone x 2 weeks mostly notes 2-3 loose bm/day no blood normal energy no abd pain weight is stable no urgency last flare in 6/2024 but admits to being stable since she was curious about pill options for treatment today no reflux. eating well no n/v  no other complaints.

## 2024-08-29 ENCOUNTER — TELEPHONE ENCOUNTER (OUTPATIENT)
Dept: URBAN - METROPOLITAN AREA CLINIC 6 | Facility: CLINIC | Age: 64
End: 2024-08-29

## 2024-08-29 RX ORDER — PREDNISONE 20 MG/1
2 TABLETS TABLET ORAL ONCE A DAY
Qty: 28 | Refills: 1 | OUTPATIENT
Start: 2024-08-30 | End: 2024-09-27

## 2024-08-29 NOTE — PHYSICAL EXAM CARDIOVASCULAR:
no edema,  no murmurs,  regular rate and rhythm
Fatty (change of) liver, not elsewhere classified

## 2024-11-11 ENCOUNTER — TELEPHONE ENCOUNTER (OUTPATIENT)
Dept: URBAN - METROPOLITAN AREA CLINIC 80 | Facility: CLINIC | Age: 64
End: 2024-11-11

## 2024-11-11 RX ORDER — PREDNISONE 20 MG/1
2 TABLETS TABLET ORAL ONCE A DAY
Qty: 28 | Refills: 1
Start: 2021-02-18 | End: 2024-12-09

## 2024-11-19 ENCOUNTER — LAB OUTSIDE AN ENCOUNTER (OUTPATIENT)
Dept: URBAN - METROPOLITAN AREA CLINIC 80 | Facility: CLINIC | Age: 64
End: 2024-11-19

## 2024-11-20 LAB
A/G RATIO: 1.8
ALBUMIN: 4.1
ALKALINE PHOSPHATASE: 72
ALT (SGPT): 16
AST (SGOT): 14
BILIRUBIN, TOTAL: 0.5
BUN/CREATININE RATIO: (no result)
BUN: 21
C-REACTIVE PROTEIN, QUANT: <3
CALCIUM: 9.7
CARBON DIOXIDE, TOTAL: 28
CHLORIDE: 103
CREATININE: 0.67
EGFR: 99
GLOBULIN, TOTAL: 2.3
GLUCOSE: 85
HEMATOCRIT: 38.4
HEMOGLOBIN: 12.7
MCH: 30.5
MCHC: 33.1
MCV: 92.1
MPV: 10.5
PLATELET COUNT: 331
POTASSIUM: 4
PROTEIN, TOTAL: 6.4
RDW: 12.9
RED BLOOD CELL COUNT: 4.17
SED RATE BY MODIFIED: 2
SODIUM: 139
WHITE BLOOD CELL COUNT: 11.7

## 2025-01-29 ENCOUNTER — TELEPHONE ENCOUNTER (OUTPATIENT)
Dept: URBAN - METROPOLITAN AREA CLINIC 80 | Facility: CLINIC | Age: 65
End: 2025-01-29

## 2025-01-29 RX ORDER — MESALAMINE 1.2 G/1
TAKE 4 TABLETS BY MOUTH EVERY DAY TABLET, DELAYED RELEASE ORAL
Qty: 360 TABLET | Refills: 3